# Patient Record
Sex: FEMALE | Race: BLACK OR AFRICAN AMERICAN | NOT HISPANIC OR LATINO | Employment: UNEMPLOYED | URBAN - METROPOLITAN AREA
[De-identification: names, ages, dates, MRNs, and addresses within clinical notes are randomized per-mention and may not be internally consistent; named-entity substitution may affect disease eponyms.]

---

## 2017-09-13 ENCOUNTER — HISTORICAL (OUTPATIENT)
Dept: RADIOLOGY | Facility: HOSPITAL | Age: 47
End: 2017-09-13

## 2019-01-15 ENCOUNTER — HISTORICAL (OUTPATIENT)
Dept: RADIOLOGY | Facility: HOSPITAL | Age: 49
End: 2019-01-15

## 2021-09-27 ENCOUNTER — HISTORICAL (OUTPATIENT)
Dept: RADIOLOGY | Facility: HOSPITAL | Age: 51
End: 2021-09-27

## 2021-10-11 ENCOUNTER — HISTORICAL (OUTPATIENT)
Dept: SURGERY | Facility: HOSPITAL | Age: 51
End: 2021-10-11

## 2021-10-12 ENCOUNTER — HISTORICAL (OUTPATIENT)
Dept: ANESTHESIOLOGY | Facility: HOSPITAL | Age: 51
End: 2021-10-12

## 2021-10-27 ENCOUNTER — HISTORICAL (OUTPATIENT)
Dept: RADIOLOGY | Facility: HOSPITAL | Age: 51
End: 2021-10-27

## 2022-04-07 ENCOUNTER — HISTORICAL (OUTPATIENT)
Dept: ADMINISTRATIVE | Facility: HOSPITAL | Age: 52
End: 2022-04-07

## 2022-04-24 VITALS
DIASTOLIC BLOOD PRESSURE: 80 MMHG | WEIGHT: 151.88 LBS | SYSTOLIC BLOOD PRESSURE: 111 MMHG | HEIGHT: 64 IN | BODY MASS INDEX: 25.93 KG/M2

## 2022-04-30 NOTE — OP NOTE
Patient:   Donna Romero             MRN: [id number]            FIN: [id number]               Age:   51 years     Sex:  Female     :  1970   Associated Diagnoses:   Encounter for screening colonoscopy; Cecal polyp   Author:   Ashely Laguerre MD      Operative Note   Operative Information   Date/ Time:  10/12/2021 10:37:00.     Procedures Performed: Procedure Code   Colonoscopy, flexible; with removal of tumor(s), polyp(s), or other lesion(s) by hot biopsy forceps ([id number])..     Indications: 51-year-old female in need of first age-appropriate colonoscopy with no family history colorectal cancer.     Preoperative Diagnosis: Encounter for screening colonoscopy (FAP07-CP Z12.11).     Postoperative Diagnosis: Cecal polyp (MKB28-JD K63.5).     Surgeon: Ashely Laguerre MD.     Anesthesia: Intravenous MAC.     Speciman Removed: Cecal polyp.     Description of Procedure/Findings/    Complications: Patient was brought to the endoscopy suite laid in the left lateral decubitus position right side up.  Intravenous anesthesia was provided.  Digital rectal exam performed exhibiting good anal rectal tone and no masses.  An endoscope was then passed through the anus intubating the rectum and with gentle insufflation reaching the cecum.  Upon reaching the cecum pictures were taken the scope was then slowly withdrawn.  Immediately within the cecum a small subcentimeter polyp was found.  Cold forcep polypectomy was then performed successfully.  Specimen was sent to pathology.  The remainder of the ascending transverse descending and rectosigmoid colon appear to be grossly normal otherwise.  Scope was retroflexed in the distal rectum revealing normal-appearing perianal mucosa no significant hemorrhoids.  Scope returned to neutral position the colon was evacuated patient was evaluated anesthesia stable condition and transferred postanesthesia care unit..     Esimated blood loss: loss  1  cc.     Findings: Cecal polyp.      Complications: None.     Notes: Recommendation repeat colonoscopy in 5 years.

## 2022-05-06 ENCOUNTER — HOSPITAL ENCOUNTER (OUTPATIENT)
Dept: RADIOLOGY | Facility: HOSPITAL | Age: 52
Discharge: HOME | End: 2022-05-06
Attending: INTERNAL MEDICINE
Payer: MEDICAID

## 2022-05-06 DIAGNOSIS — M79.641 HAND PAIN, RIGHT: ICD-10-CM

## 2022-05-06 PROCEDURE — 73130 X-RAY EXAM OF HAND: CPT | Mod: TC,RT

## 2022-10-13 ENCOUNTER — HOSPITAL ENCOUNTER (OUTPATIENT)
Dept: RADIOLOGY | Facility: HOSPITAL | Age: 52
Discharge: HOME | End: 2022-10-13
Attending: INTERNAL MEDICINE
Payer: MEDICAID

## 2022-10-13 DIAGNOSIS — R05.9 COUGH: ICD-10-CM

## 2022-10-13 PROCEDURE — 71046 X-RAY EXAM CHEST 2 VIEWS: CPT | Mod: TC

## 2023-04-19 DIAGNOSIS — M81.0 OSTEOPOROSIS: Primary | ICD-10-CM

## 2023-04-25 ENCOUNTER — LAB VISIT (OUTPATIENT)
Dept: LAB | Facility: HOSPITAL | Age: 53
End: 2023-04-25
Attending: INTERNAL MEDICINE
Payer: MEDICAID

## 2023-04-25 DIAGNOSIS — R76.0 RAISED ANTIBODY TITER: ICD-10-CM

## 2023-04-25 DIAGNOSIS — M05.79 SEROPOSITIVE RHEUMATOID ARTHRITIS OF MULTIPLE SITES: ICD-10-CM

## 2023-04-25 DIAGNOSIS — M06.9 RHEUMATOID ARTHRITIS, INVOLVING UNSPECIFIED SITE, UNSPECIFIED WHETHER RHEUMATOID FACTOR PRESENT: Primary | ICD-10-CM

## 2023-04-25 DIAGNOSIS — R79.82 ELEVATED C-REACTIVE PROTEIN (CRP): ICD-10-CM

## 2023-04-25 DIAGNOSIS — L93.2 CUTANEOUS LUPUS ERYTHEMATOSUS: ICD-10-CM

## 2023-04-25 LAB
C3 SERPL-MCNC: 179 MG/DL (ref 80–173)
C4 SERPL-MCNC: 20.6 MG/DL (ref 13–46)
CRP SERPL-MCNC: 14.8 MG/L
ERYTHROCYTE [SEDIMENTATION RATE] IN BLOOD: 22 MM/HR (ref 0–20)
RHEUMATOID FACT SERPL-ACNC: <13 IU/ML

## 2023-04-25 PROCEDURE — 30000890 MAYO GENERIC ORDERABLE: Mod: 90

## 2023-04-25 PROCEDURE — 86431 RHEUMATOID FACTOR QUANT: CPT | Mod: 91

## 2023-04-25 PROCEDURE — 86431 RHEUMATOID FACTOR QUANT: CPT

## 2023-04-25 PROCEDURE — 86160 COMPLEMENT ANTIGEN: CPT

## 2023-04-25 PROCEDURE — 86003 ALLG SPEC IGE CRUDE XTRC EA: CPT

## 2023-04-25 PROCEDURE — 86225 DNA ANTIBODY NATIVE: CPT | Mod: 90

## 2023-04-25 PROCEDURE — 86039 ANTINUCLEAR ANTIBODIES (ANA): CPT | Mod: 90

## 2023-04-25 PROCEDURE — 86235 NUCLEAR ANTIGEN ANTIBODY: CPT | Mod: 90

## 2023-04-25 PROCEDURE — 85651 RBC SED RATE NONAUTOMATED: CPT

## 2023-04-25 PROCEDURE — 86235 NUCLEAR ANTIGEN ANTIBODY: CPT

## 2023-04-25 PROCEDURE — 86140 C-REACTIVE PROTEIN: CPT

## 2023-04-25 PROCEDURE — 36415 COLL VENOUS BLD VENIPUNCTURE: CPT

## 2023-04-25 PROCEDURE — 83516 IMMUNOASSAY NONANTIBODY: CPT | Mod: 90

## 2023-04-26 LAB
ANA SER QL HEP2 SUBST: NORMAL
CYCLIC CITRULLINATED PEPTIDE (CCP) (OLG): NEGATIVE
ENA SS-A IGG SER-ACNC: <0.2 U
ENA SS-B IGG SER-ACNC: <0.2 U
MAYO GENERIC ORDERABLE RESULT: NORMAL
MAYO GENERIC ORDERABLE RESULT: NORMAL
RHEUMATOID FACTOR IGA (OLG): NEGATIVE
RHEUMATOID FACTOR IGM (OLG): POSITIVE
SMITH DP IGG (OHS): NEGATIVE

## 2023-04-30 LAB — MAYO GENERIC ORDERABLE RESULT: NORMAL

## 2023-05-02 ENCOUNTER — HOSPITAL ENCOUNTER (OUTPATIENT)
Dept: RADIOLOGY | Facility: HOSPITAL | Age: 53
Discharge: HOME | End: 2023-05-02
Attending: INTERNAL MEDICINE
Payer: MEDICAID

## 2023-05-02 DIAGNOSIS — M81.0 OSTEOPOROSIS: ICD-10-CM

## 2023-05-02 DIAGNOSIS — Z12.31 ENCOUNTER FOR SCREENING MAMMOGRAM FOR MALIGNANT NEOPLASM OF BREAST: ICD-10-CM

## 2023-05-02 PROCEDURE — 77067 MAMMO DIGITAL SCREENING BILAT WITH TOMO: ICD-10-PCS | Mod: 26,,, | Performed by: STUDENT IN AN ORGANIZED HEALTH CARE EDUCATION/TRAINING PROGRAM

## 2023-05-02 PROCEDURE — 77067 SCR MAMMO BI INCL CAD: CPT | Mod: 26,,, | Performed by: STUDENT IN AN ORGANIZED HEALTH CARE EDUCATION/TRAINING PROGRAM

## 2023-05-02 PROCEDURE — 77063 MAMMO DIGITAL SCREENING BILAT WITH TOMO: ICD-10-PCS | Mod: 26,,, | Performed by: STUDENT IN AN ORGANIZED HEALTH CARE EDUCATION/TRAINING PROGRAM

## 2023-05-02 PROCEDURE — 77067 SCR MAMMO BI INCL CAD: CPT | Mod: TC

## 2023-05-02 PROCEDURE — 77063 BREAST TOMOSYNTHESIS BI: CPT | Mod: 26,,, | Performed by: STUDENT IN AN ORGANIZED HEALTH CARE EDUCATION/TRAINING PROGRAM

## 2023-05-02 PROCEDURE — 77080 DXA BONE DENSITY AXIAL: CPT | Mod: TC

## 2023-06-26 DIAGNOSIS — M06.9 RHEUMATOID ARTHRITIS, INVOLVING UNSPECIFIED SITE, UNSPECIFIED WHETHER RHEUMATOID FACTOR PRESENT: Primary | ICD-10-CM

## 2023-09-27 ENCOUNTER — HOSPITAL ENCOUNTER (OUTPATIENT)
Dept: RADIOLOGY | Facility: HOSPITAL | Age: 53
Discharge: HOME | End: 2023-09-27
Attending: INTERNAL MEDICINE
Payer: MEDICAID

## 2023-09-27 DIAGNOSIS — R05.9 COUGH: ICD-10-CM

## 2023-09-27 PROCEDURE — 71046 X-RAY EXAM CHEST 2 VIEWS: CPT | Mod: TC

## 2024-03-14 ENCOUNTER — OFFICE VISIT (OUTPATIENT)
Dept: RHEUMATOLOGY | Facility: CLINIC | Age: 54
End: 2024-03-14
Payer: MEDICAID

## 2024-03-14 VITALS
WEIGHT: 163.63 LBS | BODY MASS INDEX: 28.99 KG/M2 | OXYGEN SATURATION: 100 % | TEMPERATURE: 98 F | HEIGHT: 63 IN | RESPIRATION RATE: 18 BRPM | SYSTOLIC BLOOD PRESSURE: 137 MMHG | HEART RATE: 87 BPM | DIASTOLIC BLOOD PRESSURE: 99 MMHG

## 2024-03-14 DIAGNOSIS — Z79.4 TYPE 2 DIABETES MELLITUS WITHOUT COMPLICATION, WITH LONG-TERM CURRENT USE OF INSULIN: ICD-10-CM

## 2024-03-14 DIAGNOSIS — E78.00 HYPERCHOLESTEROLEMIA: ICD-10-CM

## 2024-03-14 DIAGNOSIS — F32.A DEPRESSIVE DISORDER: ICD-10-CM

## 2024-03-14 DIAGNOSIS — M79.7 FIBROMYALGIA: Primary | ICD-10-CM

## 2024-03-14 DIAGNOSIS — R76.8 RHEUMATOID FACTOR POSITIVE: ICD-10-CM

## 2024-03-14 DIAGNOSIS — I10 ESSENTIAL HYPERTENSION: ICD-10-CM

## 2024-03-14 DIAGNOSIS — E11.9 TYPE 2 DIABETES MELLITUS WITHOUT COMPLICATION, WITH LONG-TERM CURRENT USE OF INSULIN: ICD-10-CM

## 2024-03-14 DIAGNOSIS — Z86.73 H/O: CVA (CEREBROVASCULAR ACCIDENT): ICD-10-CM

## 2024-03-14 PROBLEM — F41.0 PANIC ATTACK: Status: ACTIVE | Noted: 2024-03-14

## 2024-03-14 PROBLEM — I63.9 CEREBROVASCULAR ACCIDENT (CVA): Status: ACTIVE | Noted: 2024-03-14

## 2024-03-14 PROCEDURE — 99215 OFFICE O/P EST HI 40 MIN: CPT | Mod: PBBFAC | Performed by: INTERNAL MEDICINE

## 2024-03-14 PROCEDURE — 3075F SYST BP GE 130 - 139MM HG: CPT | Mod: CPTII,,, | Performed by: INTERNAL MEDICINE

## 2024-03-14 PROCEDURE — 1159F MED LIST DOCD IN RCRD: CPT | Mod: CPTII,,, | Performed by: INTERNAL MEDICINE

## 2024-03-14 PROCEDURE — 3008F BODY MASS INDEX DOCD: CPT | Mod: CPTII,,, | Performed by: INTERNAL MEDICINE

## 2024-03-14 PROCEDURE — 3080F DIAST BP >= 90 MM HG: CPT | Mod: CPTII,,, | Performed by: INTERNAL MEDICINE

## 2024-03-14 PROCEDURE — 99205 OFFICE O/P NEW HI 60 MIN: CPT | Mod: S$PBB,,, | Performed by: INTERNAL MEDICINE

## 2024-03-14 RX ORDER — ZIPRASIDONE HYDROCHLORIDE 80 MG/1
80 CAPSULE ORAL 2 TIMES DAILY
COMMUNITY

## 2024-03-14 RX ORDER — INSULIN PUMP CONTROLLER
EACH MISCELLANEOUS
COMMUNITY
Start: 2024-01-17

## 2024-03-14 RX ORDER — METOPROLOL SUCCINATE 50 MG/1
1 TABLET, EXTENDED RELEASE ORAL DAILY
COMMUNITY
Start: 2023-08-15

## 2024-03-14 RX ORDER — ERGOCALCIFEROL 1.25 1/1
50000 CAPSULE ORAL
COMMUNITY

## 2024-03-14 RX ORDER — INSULIN PUMP CONTROLLER
EACH MISCELLANEOUS
COMMUNITY
Start: 2024-02-15

## 2024-03-14 RX ORDER — LABETALOL 100 MG/1
100 TABLET, FILM COATED ORAL ONCE
COMMUNITY

## 2024-03-14 RX ORDER — ATORVASTATIN CALCIUM 40 MG/1
1 TABLET, FILM COATED ORAL DAILY
COMMUNITY

## 2024-03-14 RX ORDER — ASPIRIN 81 MG/1
81 TABLET ORAL DAILY
COMMUNITY
Start: 2021-10-11

## 2024-03-14 RX ORDER — CLONIDINE HYDROCHLORIDE 0.1 MG/1
0.1 TABLET ORAL ONCE AS NEEDED
COMMUNITY

## 2024-03-14 RX ORDER — BUTALBITAL, ACETAMINOPHEN AND CAFFEINE 50; 325; 40 MG/1; MG/1; MG/1
1 TABLET ORAL EVERY 6 HOURS PRN
COMMUNITY

## 2024-03-14 RX ORDER — LINACLOTIDE 145 UG/1
145 CAPSULE, GELATIN COATED ORAL DAILY PRN
COMMUNITY
Start: 2021-09-06

## 2024-03-14 RX ORDER — INSULIN ASPART 100 [IU]/ML
INJECTION, SOLUTION INTRAVENOUS; SUBCUTANEOUS
COMMUNITY

## 2024-03-14 RX ORDER — INSULIN GLARGINE 100 [IU]/ML
INJECTION, SOLUTION SUBCUTANEOUS
COMMUNITY
Start: 2023-12-14

## 2024-03-14 RX ORDER — PEN NEEDLE, DIABETIC 31 GX5/16"
NEEDLE, DISPOSABLE MISCELLANEOUS
COMMUNITY
Start: 2024-02-15

## 2024-03-14 RX ORDER — LINAGLIPTIN 5 MG/1
1 TABLET, FILM COATED ORAL DAILY
COMMUNITY
Start: 2021-10-11

## 2024-03-14 RX ORDER — DULOXETIN HYDROCHLORIDE 30 MG/1
30 CAPSULE, DELAYED RELEASE ORAL DAILY
COMMUNITY

## 2024-03-14 RX ORDER — ZOLPIDEM TARTRATE 10 MG/1
10 TABLET ORAL NIGHTLY PRN
COMMUNITY

## 2024-03-14 RX ORDER — AMLODIPINE BESYLATE 10 MG/1
10 TABLET ORAL DAILY
COMMUNITY

## 2024-03-14 RX ORDER — BUSPIRONE HYDROCHLORIDE 15 MG/1
15 TABLET ORAL 3 TIMES DAILY
COMMUNITY

## 2024-03-14 RX ORDER — BUTALBITAL, ACETAMINOPHEN, CAFFEINE AND CODEINE PHOSPHATE 50; 325; 40; 30 MG/1; MG/1; MG/1; MG/1
CAPSULE ORAL
COMMUNITY
Start: 2021-10-19

## 2024-03-14 RX ORDER — EMPAGLIFLOZIN 25 MG/1
1 TABLET, FILM COATED ORAL DAILY
COMMUNITY
Start: 2023-12-14

## 2024-03-14 RX ORDER — DICLOFENAC SODIUM 30 MG/G
GEL TOPICAL 3 TIMES DAILY PRN
COMMUNITY

## 2024-03-14 RX ORDER — ALPRAZOLAM 1 MG/1
1 TABLET ORAL DAILY PRN
COMMUNITY

## 2024-03-14 RX ORDER — OMEPRAZOLE 40 MG/1
40 CAPSULE, DELAYED RELEASE ORAL EVERY MORNING
COMMUNITY

## 2024-03-14 RX ORDER — CALCIUM CITRATE/VITAMIN D3 200MG-6.25
TABLET ORAL
COMMUNITY
Start: 2023-12-14

## 2024-03-14 RX ORDER — GABAPENTIN 400 MG/1
400 CAPSULE ORAL 3 TIMES DAILY
COMMUNITY

## 2024-03-14 NOTE — PROGRESS NOTES
Patient ID: [id number]     Chief Complaint: Rheumatoid Arthritis (Pt states she has leg pain. )      Referred By: Dr. Lucas Ray     HPI:     Donna Romero is a 53 y.o. female here today for a new patient visit.      C/o pain in legs, whole legs, starts form thigh and radiates to bilateral feet. Admits cramps, tingling and numbness in bilateral toes. Sometimes she has to stay in bed because of pain, went to ER multiple times because of pain. Pain in right arm sometimes. She has this pain since she was 13 years old. Admits swelling in joints of hands and feet sometimes. Morning stiffness for an hour.   She has been seeing Dr Ray since she was 23 years old.   She has DM on insulin, not controlled, HgA1C was 14.2, she is waiting for insulin pump. She also takes Jardiance and Tradjenta.   H/o CVA when she was 23 years old form HTN.     Denies history of fevers, rashes, photosensitivity, oral or nasal ulcers, h/o MI, seizures, h/o PE or DVT, Raynaud's phenomenon, uveitis, malignancies.   Family history of autoimmune disease: none  Pregnancies: 7  Miscarriages: 4, before 3-4 weeks.   Smoking: nonsmoker.       Social History     Tobacco Use   Smoking Status Never   Smokeless Tobacco Never          ----------------------------  Anxiety disorder, unspecified  Essential (primary) hypertension  Rheumatoid arthritis, unspecified  Stroke  Type 2 diabetes mellitus without complications     Past Surgical History:   Procedure Laterality Date    BACK SURGERY       SECTION         Review of patient's allergies indicates:   Allergen Reactions    Plaquenil [hydroxychloroquine]     Sulfa (sulfonamide antibiotics) Other (See Comments)       Outpatient Medications Marked as Taking for the 3/14/24 encounter (Office Visit) with Marquita Thomas MD   Medication Sig Dispense Refill    ALPRAZolam (XANAX) 1 MG tablet Take 1 mg by mouth daily as needed.      amLODIPine (NORVASC) 10 MG tablet Take 10 mg by mouth once daily.       "aspirin (ECOTRIN) 81 MG EC tablet Take 81 mg by mouth once daily.      atorvastatin (LIPITOR) 40 MG tablet Take 1 tablet by mouth once daily.      BD ULTRA-FINE SHORT PEN NEEDLE 31 gauge x 5/16" Ndle SMARTSI Each SUB-Q Daily      busPIRone (BUSPAR) 15 MG tablet Take 15 mg by mouth 3 (three) times daily. Pt takes bid      butalbitaL-acetaminop-caf-cod -23-30 mg Cap Take 1 capsule as needed by oral route for 15 days.      butalbital-acetaminophen-caffeine -40 mg (FIORICET, ESGIC) -40 mg per tablet Take 1 tablet by mouth every 6 (six) hours as needed.      cloNIDine (CATAPRES) 0.1 MG tablet Take 0.1 mg by mouth 1 (one) time if needed. Pt takes for high blood pressure      diclofenac sodium (SOLARAZE) 3 % gel Apply topically 3 (three) times daily as needed.      DULoxetine (CYMBALTA) 30 MG capsule Take 30 mg by mouth once daily.      gabapentin (NEURONTIN) 400 MG capsule Take 400 mg by mouth 3 (three) times daily.      JARDIANCE 25 mg tablet Take 1 tablet by mouth once daily.      labetaloL (NORMODYNE) 100 MG tablet Take 100 mg by mouth once.      LANTUS SOLOSTAR U-100 INSULIN glargine 100 units/mL SubQ pen Inject 45 units every day by subcutaneous route.      LINZESS 145 mcg Cap capsule Take 145 mcg by mouth daily as needed (patient takes once a week).      metoprolol succinate (TOPROL-XL) 50 MG 24 hr tablet Take 1 tablet by mouth once daily.      NOVOLOG FLEXPEN U-100 INSULIN 100 unit/mL (3 mL) InPn pen per sliding scale      omeprazole (PRILOSEC) 40 MG capsule Take 40 mg by mouth every morning.      OMNIPOD DASH PDM KIT, GEN 4, Misc USE AS DIRECTED, CHANGE POD EVERY 72 HOURS.      OMNIPOD DASH PODS, GEN 4, Crtg Inject into the skin.      TRADJENTA 5 mg Tab tablet Take 1 tablet by mouth once daily.      TRUE METRIX GLUCOSE TEST STRIP Strp       VITAMIN D2 1,250 mcg (50,000 unit) capsule Take 50,000 Units by mouth every 7 days.      ziprasidone (GEODON) 80 MG capsule Take 80 mg by mouth 2 (two) " times daily.      zolpidem (AMBIEN) 10 mg Tab Take 10 mg by mouth nightly as needed.         Social History     Socioeconomic History    Marital status: Single   Tobacco Use    Smoking status: Never    Smokeless tobacco: Never   Substance and Sexual Activity    Alcohol use: Not Currently    Drug use: Never        Family History   Problem Relation Age of Onset    Hypertension Mother     Heart disease Mother     Diabetes Mellitus Mother     Heart attack Mother     Cancer Father 63        Throat        Immunization History   Administered Date(s) Administered    COVID-19, MRNA, LN-S, PF (Pfizer) (Purple Cap) 06/03/2021, 06/24/2021    DTP 1970, 1970, 03/07/1972, 12/04/1973, 08/28/1977    MMR 03/02/2004    Measles / Rubella 09/12/1972    Mumps 04/15/1980    OPV 1971, 03/07/1972, 12/04/1973, 08/31/1976, 08/23/1977    Td (ADULT) 03/02/2004       Patient Care Team:  Lucas Ray MD as PCP - General (Internal Medicine)     Subjective:     Constitutional:  Denies chills. Denies fever. Denies night sweats. Denies weight loss.   Ophthalmology: Denies blurred vision. Denies dry eyes. Denies eye pain. Denies Itching and redness.   ENT: Denies oral ulcers. Denies epistaxis. Denies dry mouth. Denies swollen glands.   Endocrine: Admits diabetes. Denies thyroid Problems.   Respiratory: Denies cough. Denies shortness of breath. Denies shortness of breath with exertion. Denies hemoptysis.   Cardiovascular: Denies chest pain at rest. Denies chest pain with exertion. Denies palpitations.    Gastrointestinal: Denies abdominal pain. Denies diarrhea. Denies nausea. Denies vomiting. Denies hematemesis or hematochezia. Denies heartburn.  Genitourinary: Denies blood in urine.  Musculoskeletal: See HPI for details  Integumentary: Denies rash. Denies photosensitivity.   Peripheral Vascular: Denies Ulcers of hands and/or feet. Denies Cold extremities.   Neurologic: Denies dizziness. Denies headache.  Denies loss of strength.  "Denies numbness or tingling.   Psychiatric: Admits depression. Admits anxiety. Denies suicidal/homicidal ideations.      Objective:     BP (!) 137/99 (BP Location: Left arm, Patient Position: Sitting, BP Method: Medium (Automatic))   Pulse 87   Temp 97.9 °F (36.6 °C) (Oral)   Resp 18   Ht 5' 3" (1.6 m)   Wt 74.2 kg (163 lb 9.6 oz)   SpO2 100%   BMI 28.98 kg/m²     Physical Exam    General Appearance: alert, pleasant, in no acute distress.  Skin: Skin color, texture, turgor normal. No rashes or lesions.  Eyes:  extraocular movement intact (EOMI), pupils equal, round, reactive to light and accommodation, conjunctiva clear.  ENT: No oral or nasal ulcers.  Neck:  Neck supple. No adenopathy.   Lungs: CTA throughout without crackles, rhonchi, or wheezes.   Heart: RRR w/o murmurs.  No edema.   Abdomen: Soft, non-tender, no masses, rebound or guarding.  Neuro: Alert, oriented, CN II-XII GI, sensory and motor innervation intact.  Musculoskeletal:  Multiple tender points on exam, tenderness in multiple soft tissue regions.  No synovitis on exam.  No tenderness in MCPs or MTPs.  Callus/corn below left first MTP  Psych: Alert, oriented, normal eye contact.      Labs:     4/2023: LIZETH, dsDNA, chromatin, centromere, SSA, SSB, smith negative. C3, C4 normal. CRP 14.8, normal less than 5. ESR 22, normal less than 20. CCP negative. RF IgA negative. RF IgM positive, no value given. RF quantitative negative.     Imaging:     Assessment:       ICD-10-CM ICD-9-CM   1. Fibromyalgia  M79.7 729.1   2. Rheumatoid factor positive  R76.8 795.79   3. Essential hypertension  I10 401.9   4. Hypercholesterolemia  E78.00 272.0   5. Type 2 diabetes mellitus without complication, with long-term current use of insulin  E11.9 250.00    Z79.4 V58.67   6. Depressive disorder  F32.A 311   7. H/O: CVA (cerebrovascular accident)  Z86.73 V12.54        Plan:     1. Fibromyalgia:  Multiple tender points on exam, no synovitis on exam.  Pain in arms and " legs secondary to fibromyalgia.  Discussed disease course and treatment options of fibromyalgia.    Discussed clinical features of fibromyalgia in detail.  Fibromyalgia is a chronic pain syndrome.  Underlying causes of fibromyalgia may be numerous.  An underlying nonrestorative sleep pattern, mental and physical stressors play a role in pain perception.    Discussed relationship of pain with sleep.  The brain functions best with a normal restful sleep that includes REM sleep.  Discussed that a nonrestorative sleep pattern may cause a decrease in pain tolerance.  Discussed that over time, this builds up and causes a cycling effect on pain.  This pain is not easily curable with pain medications or narcotics.  It is important to decrease stress levels and improving sleep patterns/hygiene.  A restorative sleep pattern is important in improving the perception of pain.    Medications for fibromyalgia only help 10-20% of the time and come with multiple side effects. FDA recommended medications for fibromyalgia include: lyrica, cymbalta, and savella.  Other medications which have been used in fibromyalgia include amitriptyline, gabapentin, flexeril, gabapentin, and low dose naltresone.     Exercise and a healthy life-style is important in management of this syndrome. We discussed this at length and I have recommended regular low impact exercise, preferably aquatic therapy, as well as cognitive/ behavioral therapy.       2. Rheumatoid factor positive:  Positive rheumatoid factor IgM, no synovitis on exam.  Anti CCP negative.  Currently does not have rheumatoid arthritis or other inflammatory arthritis.  Pain in arms and legs secondary to fibromyalgia.  - advised to follow-up with her PCP and she can be re-referred for any new issues in the future.  Thank you for the referral.     3. Essential hypertension:  Blood pressure slightly high today, educated to stay compliant with blood pressure medications.       4. Type 2  diabetes mellitus:  Poorly-controlled diabetes, advised to follow-up with her PCP closely.  Educated to stay compliant with diet and medications.          Follow up if symptoms worsen or fail to improve. In addition to their scheduled follow up, the patient has also been instructed to follow up on as needed basis.        Total time spent with patient and documentation is 60 minutes. All questions were answered to patient's satisfaction and patient verbalized understanding.

## 2024-05-08 ENCOUNTER — LAB VISIT (OUTPATIENT)
Dept: LAB | Facility: HOSPITAL | Age: 54
End: 2024-05-08
Attending: INTERNAL MEDICINE
Payer: MEDICAID

## 2024-05-08 DIAGNOSIS — M06.9 RHEUMATOID ARTHRITIS, INVOLVING UNSPECIFIED SITE, UNSPECIFIED WHETHER RHEUMATOID FACTOR PRESENT: ICD-10-CM

## 2024-05-08 DIAGNOSIS — I10 HYPERTENSION, UNSPECIFIED TYPE: ICD-10-CM

## 2024-05-08 DIAGNOSIS — E11.65 INADEQUATELY CONTROLLED DIABETES MELLITUS: Primary | ICD-10-CM

## 2024-05-08 DIAGNOSIS — M79.10 MYALGIA: ICD-10-CM

## 2024-05-08 DIAGNOSIS — E78.5 HYPERLIPIDEMIA, UNSPECIFIED HYPERLIPIDEMIA TYPE: ICD-10-CM

## 2024-05-08 LAB
ALBUMIN SERPL-MCNC: 3.3 G/DL (ref 3.5–5)
ALBUMIN/GLOB SERPL: 0.7 RATIO (ref 1.1–2)
ALP SERPL-CCNC: 97 UNIT/L (ref 40–150)
ALT SERPL-CCNC: 13 UNIT/L (ref 0–55)
APPEARANCE UR: CLEAR
AST SERPL-CCNC: 16 UNIT/L (ref 5–34)
BASOPHILS # BLD AUTO: 0.04 X10(3)/MCL
BASOPHILS NFR BLD AUTO: 0.4 %
BILIRUB SERPL-MCNC: 0.4 MG/DL
BILIRUB UR QL STRIP.AUTO: NEGATIVE
BUN SERPL-MCNC: 14 MG/DL (ref 9.8–20.1)
C3 SERPL-MCNC: 162 MG/DL (ref 80–173)
C4 SERPL-MCNC: 20 MG/DL (ref 13–46)
CALCIUM SERPL-MCNC: 9.4 MG/DL (ref 8.4–10.2)
CHLORIDE SERPL-SCNC: 105 MMOL/L (ref 98–107)
CHOLEST SERPL-MCNC: 153 MG/DL
CHOLEST/HDLC SERPL: 4 {RATIO} (ref 0–5)
CK SERPL-CCNC: 103 U/L (ref 29–168)
CO2 SERPL-SCNC: 29 MMOL/L (ref 22–29)
COLOR UR AUTO: YELLOW
CREAT SERPL-MCNC: 0.99 MG/DL (ref 0.55–1.02)
CRP SERPL-MCNC: 18.8 MG/L
EOSINOPHIL # BLD AUTO: 0.13 X10(3)/MCL (ref 0–0.9)
EOSINOPHIL NFR BLD AUTO: 1.1 %
ERYTHROCYTE [DISTWIDTH] IN BLOOD BY AUTOMATED COUNT: 15 % (ref 11.5–17)
ERYTHROCYTE [SEDIMENTATION RATE] IN BLOOD: 23 MM/HR (ref 0–20)
EST. AVERAGE GLUCOSE BLD GHB EST-MCNC: 226 MG/DL
GFR SERPLBLD CREATININE-BSD FMLA CKD-EPI: >60 ML/MIN/1.73/M2
GLOBULIN SER-MCNC: 4.5 GM/DL (ref 2.4–3.5)
GLUCOSE SERPL-MCNC: 67 MG/DL (ref 74–100)
GLUCOSE UR QL STRIP.AUTO: ABNORMAL
HBA1C MFR BLD: 9.5 %
HCT VFR BLD AUTO: 42.5 % (ref 37–47)
HDLC SERPL-MCNC: 42 MG/DL (ref 35–60)
HGB BLD-MCNC: 13.2 G/DL (ref 12–16)
IMM GRANULOCYTES # BLD AUTO: 0.04 X10(3)/MCL (ref 0–0.04)
IMM GRANULOCYTES NFR BLD AUTO: 0.4 %
KETONES UR QL STRIP.AUTO: NEGATIVE
LDLC SERPL CALC-MCNC: 91 MG/DL (ref 50–140)
LEUKOCYTE ESTERASE UR QL STRIP.AUTO: NEGATIVE
LYMPHOCYTES # BLD AUTO: 3.18 X10(3)/MCL (ref 0.6–4.6)
LYMPHOCYTES NFR BLD AUTO: 27.9 %
MAGNESIUM SERPL-MCNC: 2.3 MG/DL (ref 1.6–2.6)
MCH RBC QN AUTO: 27.6 PG (ref 27–31)
MCHC RBC AUTO-ENTMCNC: 31.1 G/DL (ref 33–36)
MCV RBC AUTO: 88.9 FL (ref 80–94)
MICROALBUMIN UR-MCNC: <5 UG/ML
MONOCYTES # BLD AUTO: 0.6 X10(3)/MCL (ref 0.1–1.3)
MONOCYTES NFR BLD AUTO: 5.3 %
NEUTROPHILS # BLD AUTO: 7.42 X10(3)/MCL (ref 2.1–9.2)
NEUTROPHILS NFR BLD AUTO: 64.9 %
NITRITE UR QL STRIP.AUTO: NEGATIVE
PH UR STRIP.AUTO: 5.5 [PH]
PLATELET # BLD AUTO: 366 X10(3)/MCL (ref 130–400)
PMV BLD AUTO: 10.2 FL (ref 7.4–10.4)
POTASSIUM SERPL-SCNC: 4.1 MMOL/L (ref 3.5–5.1)
PROT SERPL-MCNC: 7.8 GM/DL (ref 6.4–8.3)
PROT UR QL STRIP.AUTO: NEGATIVE
RBC # BLD AUTO: 4.78 X10(6)/MCL (ref 4.2–5.4)
RBC UR QL AUTO: NEGATIVE
RHEUMATOID FACT SERPL-ACNC: <13 IU/ML
SODIUM SERPL-SCNC: 138 MMOL/L (ref 136–145)
SP GR UR STRIP.AUTO: 1.01 (ref 1–1.03)
TRIGL SERPL-MCNC: 100 MG/DL (ref 37–140)
UROBILINOGEN UR STRIP-ACNC: 0.2
VLDLC SERPL CALC-MCNC: 20 MG/DL
WBC # SPEC AUTO: 11.41 X10(3)/MCL (ref 4.5–11.5)

## 2024-05-08 PROCEDURE — 86140 C-REACTIVE PROTEIN: CPT

## 2024-05-08 PROCEDURE — 82550 ASSAY OF CK (CPK): CPT

## 2024-05-08 PROCEDURE — 85025 COMPLETE CBC W/AUTO DIFF WBC: CPT

## 2024-05-08 PROCEDURE — 86235 NUCLEAR ANTIGEN ANTIBODY: CPT

## 2024-05-08 PROCEDURE — 86160 COMPLEMENT ANTIGEN: CPT

## 2024-05-08 PROCEDURE — 85652 RBC SED RATE AUTOMATED: CPT

## 2024-05-08 PROCEDURE — 86225 DNA ANTIBODY NATIVE: CPT

## 2024-05-08 PROCEDURE — 80061 LIPID PANEL: CPT

## 2024-05-08 PROCEDURE — 86431 RHEUMATOID FACTOR QUANT: CPT

## 2024-05-08 PROCEDURE — 86039 ANTINUCLEAR ANTIBODIES (ANA): CPT

## 2024-05-08 PROCEDURE — 86200 CCP ANTIBODY: CPT

## 2024-05-08 PROCEDURE — 83735 ASSAY OF MAGNESIUM: CPT

## 2024-05-08 PROCEDURE — 81003 URINALYSIS AUTO W/O SCOPE: CPT

## 2024-05-08 PROCEDURE — 36415 COLL VENOUS BLD VENIPUNCTURE: CPT

## 2024-05-08 PROCEDURE — 80053 COMPREHEN METABOLIC PANEL: CPT

## 2024-05-08 PROCEDURE — 83036 HEMOGLOBIN GLYCOSYLATED A1C: CPT

## 2024-05-08 PROCEDURE — 82043 UR ALBUMIN QUANTITATIVE: CPT

## 2024-05-09 LAB
ANA SER QL HEP2 SUBST: NORMAL
ENA SS-A IGG SER-ACNC: <0.2 U
ENA SS-B IGG SER-ACNC: <0.2 U

## 2024-05-12 LAB
ANTINUCLEAR ANTIBODY SCREEN (OHS): NEGATIVE
CENTROMERE QUANT (OHS): <0.4 U/ML
CYCLIC CITRULLINATED PEPTIDE (CCP) (OHS): 1 U/ML
DSDNA AB QUANT (OHS): 0.8 IU/ML
JO-1 AB QUANT (OHS): <0.3 U/ML
RNP70 AB QUANT (OHS): 0.4 U/ML
RNP70 AB QUANT (OHS): 0.4 U/ML
SCL-70S AB QUANT (OHS): 0.6 U/ML
SMITH AB QUANT (OHS): <0.7 U/ML
SMITH AB QUANT (OHS): <0.7 U/ML
SSA(RO) AB QUANT (OHS): 0.5 U/ML
SSB(LA) AB QUANT (OHS): <0.4 U/ML
U1RNP AB QUANT (OHS): 0.9 U/ML

## 2024-05-20 ENCOUNTER — HOSPITAL ENCOUNTER (OUTPATIENT)
Dept: RADIOLOGY | Facility: HOSPITAL | Age: 54
Discharge: HOME | End: 2024-05-20
Attending: ALLERGY & IMMUNOLOGY
Payer: MEDICAID

## 2024-05-20 DIAGNOSIS — R52 PAIN: ICD-10-CM

## 2024-05-20 PROCEDURE — 73562 X-RAY EXAM OF KNEE 3: CPT | Mod: TC,RT

## 2024-06-07 DIAGNOSIS — E11.65 TYPE 2 DIABETES MELLITUS WITH HYPERGLYCEMIA, UNSPECIFIED WHETHER LONG TERM INSULIN USE: Primary | ICD-10-CM

## 2024-06-12 DIAGNOSIS — Z12.31 ENCOUNTER FOR SCREENING MAMMOGRAM FOR MALIGNANT NEOPLASM OF BREAST: Primary | ICD-10-CM

## 2024-06-17 PROBLEM — I63.9 CEREBROVASCULAR ACCIDENT (CVA): Status: RESOLVED | Noted: 2024-03-14 | Resolved: 2024-06-17

## 2024-07-23 ENCOUNTER — HOSPITAL ENCOUNTER (OUTPATIENT)
Dept: RADIOLOGY | Facility: HOSPITAL | Age: 54
Discharge: HOME | End: 2024-07-23
Attending: INTERNAL MEDICINE
Payer: MEDICAID

## 2024-07-23 DIAGNOSIS — Z12.31 ENCOUNTER FOR SCREENING MAMMOGRAM FOR MALIGNANT NEOPLASM OF BREAST: ICD-10-CM

## 2024-07-23 PROCEDURE — 77067 SCR MAMMO BI INCL CAD: CPT | Mod: TC

## 2024-12-06 ENCOUNTER — CLINICAL SUPPORT (OUTPATIENT)
Dept: ENDOCRINOLOGY | Facility: CLINIC | Age: 54
End: 2024-12-06
Payer: MEDICAID

## 2024-12-06 ENCOUNTER — OFFICE VISIT (OUTPATIENT)
Dept: ENDOCRINOLOGY | Facility: CLINIC | Age: 54
End: 2024-12-06
Payer: MEDICAID

## 2024-12-06 ENCOUNTER — LAB VISIT (OUTPATIENT)
Dept: LAB | Facility: HOSPITAL | Age: 54
End: 2024-12-06
Attending: NURSE PRACTITIONER
Payer: MEDICAID

## 2024-12-06 VITALS
BODY MASS INDEX: 29.38 KG/M2 | TEMPERATURE: 98 F | SYSTOLIC BLOOD PRESSURE: 139 MMHG | RESPIRATION RATE: 18 BRPM | HEART RATE: 80 BPM | WEIGHT: 165.81 LBS | DIASTOLIC BLOOD PRESSURE: 84 MMHG | HEIGHT: 63 IN

## 2024-12-06 DIAGNOSIS — E11.65 UNCONTROLLED TYPE 2 DIABETES MELLITUS WITH HYPERGLYCEMIA: Primary | ICD-10-CM

## 2024-12-06 DIAGNOSIS — R53.83 FATIGUE, UNSPECIFIED TYPE: ICD-10-CM

## 2024-12-06 DIAGNOSIS — E11.65 TYPE 2 DIABETES MELLITUS WITH HYPERGLYCEMIA, UNSPECIFIED WHETHER LONG TERM INSULIN USE: ICD-10-CM

## 2024-12-06 DIAGNOSIS — E11.65 UNCONTROLLED TYPE 2 DIABETES MELLITUS WITH HYPERGLYCEMIA: ICD-10-CM

## 2024-12-06 LAB
ALBUMIN SERPL-MCNC: 3.6 G/DL (ref 3.5–5)
ALBUMIN/GLOB SERPL: 0.8 RATIO (ref 1.1–2)
ALP SERPL-CCNC: 88 UNIT/L (ref 40–150)
ALT SERPL-CCNC: 12 UNIT/L (ref 0–55)
ANION GAP SERPL CALC-SCNC: 6 MEQ/L
AST SERPL-CCNC: 16 UNIT/L (ref 5–34)
BILIRUB SERPL-MCNC: 0.5 MG/DL
BUN SERPL-MCNC: 13.6 MG/DL (ref 9.8–20.1)
CALCIUM SERPL-MCNC: 9.8 MG/DL (ref 8.4–10.2)
CHLORIDE SERPL-SCNC: 105 MMOL/L (ref 98–107)
CO2 SERPL-SCNC: 28 MMOL/L (ref 22–29)
CREAT SERPL-MCNC: 0.88 MG/DL (ref 0.55–1.02)
CREAT UR-MCNC: 100.2 MG/DL (ref 45–106)
CREAT/UREA NIT SERPL: 15
GFR SERPLBLD CREATININE-BSD FMLA CKD-EPI: >60 ML/MIN/1.73/M2
GLOBULIN SER-MCNC: 4.7 GM/DL (ref 2.4–3.5)
GLUCOSE SERPL-MCNC: 74 MG/DL (ref 74–100)
HBA1C MFR BLD: 9.2 %
MICROALBUMIN UR-MCNC: 25.2 UG/ML
MICROALBUMIN/CREAT RATIO PNL UR: 25.1 MG/GM CR (ref 0–30)
POTASSIUM SERPL-SCNC: 4.1 MMOL/L (ref 3.5–5.1)
PROT SERPL-MCNC: 8.3 GM/DL (ref 6.4–8.3)
SODIUM SERPL-SCNC: 139 MMOL/L (ref 136–145)
T4 FREE SERPL-MCNC: 1.14 NG/DL (ref 0.7–1.48)
TSH SERPL-ACNC: 2.05 UIU/ML (ref 0.35–4.94)

## 2024-12-06 PROCEDURE — 84439 ASSAY OF FREE THYROXINE: CPT

## 2024-12-06 PROCEDURE — 82570 ASSAY OF URINE CREATININE: CPT | Performed by: NURSE PRACTITIONER

## 2024-12-06 PROCEDURE — 80053 COMPREHEN METABOLIC PANEL: CPT

## 2024-12-06 PROCEDURE — 86376 MICROSOMAL ANTIBODY EACH: CPT

## 2024-12-06 PROCEDURE — 84681 ASSAY OF C-PEPTIDE: CPT

## 2024-12-06 PROCEDURE — 84443 ASSAY THYROID STIM HORMONE: CPT

## 2024-12-06 PROCEDURE — 99214 OFFICE O/P EST MOD 30 MIN: CPT | Mod: PBBFAC | Performed by: NURSE PRACTITIONER

## 2024-12-06 PROCEDURE — 36415 COLL VENOUS BLD VENIPUNCTURE: CPT

## 2024-12-06 PROCEDURE — 86341 ISLET CELL ANTIBODY: CPT

## 2024-12-06 RX ORDER — BLOOD-GLUCOSE TRANSMITTER
EACH MISCELLANEOUS
Qty: 1 EACH | Refills: 3 | Status: SHIPPED | OUTPATIENT
Start: 2024-12-06

## 2024-12-06 RX ORDER — BLOOD-GLUCOSE SENSOR
EACH MISCELLANEOUS
Qty: 3 EACH | Refills: 11 | Status: SHIPPED | OUTPATIENT
Start: 2024-12-06

## 2024-12-06 RX ORDER — TRAMADOL HYDROCHLORIDE 50 MG/1
50 TABLET ORAL EVERY 6 HOURS
COMMUNITY

## 2024-12-06 RX ORDER — INSULIN PMP CART,AUT,G6/7,CNTR
EACH SUBCUTANEOUS
Qty: 10 EACH | Refills: 11 | Status: SHIPPED | OUTPATIENT
Start: 2024-12-06

## 2024-12-06 RX ORDER — INSULIN PMP CART,AUT,G6/7,CNTR
EACH SUBCUTANEOUS
Qty: 1 EACH | Refills: 0 | Status: SHIPPED | OUTPATIENT
Start: 2024-12-06

## 2024-12-06 NOTE — PROGRESS NOTES
Donnanoam Romero is a 54 y.o. female here for a diabetic eye screening with non-dilated fundus photos per Khushbu.    Patient cooperative?: Yes  Small pupils?: Yes  Last eye exam: unknown    For exam results, see Encounter Report.

## 2024-12-06 NOTE — PROGRESS NOTES
Subjective     Patient ID: Donna Romero is a 54 y.o. female.    Chief Complaint: Diabetes (NEW PATIENT POC )    Endocrine clinic note 12/06/2024:  54-year-old female scheduled today for endocrine clinic as new patient referral.  History of uncontrolled type 2 diabetes currently on Omnipod dash.  Patient states she was on oral medications her A1c was > 14 and she was started on MDI then transitioned to an Omnipod.  Current A1c 9.2.  Patient has a the clinic today she does not have her Dexcom G6 x2 weeks states she needs a transmitter.  Patient has been on Omnipod-but has had no formal training and does not enter carbs are prandial doses.  Unable to download patient's Dexcom today she is not 1 in 2 weeks and did not bring her .  Patient states her PCP road supplies for an Omnipod 5 and she has a supplies but has not been able to start due to have no one able to train her.  Discussed with the patient I will have her set up with Diabetes Education next week she is to bring Omnipod 5 supplies with Dexcom G6 supplies for formal training and to convert to the Omnipod 5.  Patient states currently at night that she is having hypoglycemia and waking up sweating on today's visit decrease patient's nighttime basal rate on her Omnipod.  Patient previously had medications which she states was stopped then restarted by different PCP were then stopped again she states Jardiance was rewritten she did not restart.  Discussed patient 1st we will complete a C-peptide and cherry 65 that she is not to restart until C-peptide is complete.  Foot exam performed today see documentation.  eye exam fundus performed today.       Review of Systems   Constitutional:  Negative for activity change, appetite change and fatigue.   HENT:  Negative for dental problem, hearing loss, tinnitus, trouble swallowing and goiter.    Eyes:  Negative for photophobia, pain and visual disturbance.   Respiratory:  Negative for cough, chest tightness and  wheezing.    Cardiovascular:  Negative for chest pain, palpitations and leg swelling.   Gastrointestinal:  Negative for abdominal pain, constipation, diarrhea, nausea and reflux.   Endocrine: Negative for cold intolerance, heat intolerance, polydipsia and polyphagia.   Genitourinary:  Negative for difficulty urinating, flank pain, hematuria, hot flashes, menstrual irregularity, menstrual problem, nocturia and urgency.   Musculoskeletal:  Negative for back pain, gait problem, joint swelling, leg pain and joint deformity.   Integumentary:  Negative for color change, pallor, rash and breast discharge.   Allergic/Immunologic: Negative for environmental allergies, food allergies and immunocompromised state.   Neurological:  Negative for tremors, seizures, headaches, memory loss and coordination difficulties.   Psychiatric/Behavioral:  Negative for agitation, behavioral problems and sleep disturbance. The patient is not nervous/anxious.           Objective     Physical Exam  Constitutional:       General: She is not in acute distress.     Appearance: Normal appearance. She is not ill-appearing.   HENT:      Head: Normocephalic and atraumatic.      Right Ear: External ear normal.      Left Ear: External ear normal.      Nose: Nose normal. No congestion or rhinorrhea.      Mouth/Throat:      Mouth: Mucous membranes are moist.      Pharynx: Oropharynx is clear. No oropharyngeal exudate.   Eyes:      General:         Right eye: No discharge.         Left eye: No discharge.      Conjunctiva/sclera: Conjunctivae normal.      Pupils: Pupils are equal, round, and reactive to light.   Neck:      Thyroid: No thyroid mass, thyromegaly or thyroid tenderness.   Cardiovascular:      Rate and Rhythm: Normal rate and regular rhythm.      Pulses: Normal pulses.           Dorsalis pedis pulses are 2+ on the right side and 2+ on the left side.        Posterior tibial pulses are 2+ on the right side and 2+ on the left side.      Heart  sounds: Normal heart sounds. No murmur heard.  Pulmonary:      Effort: Pulmonary effort is normal. No respiratory distress.      Breath sounds: Normal breath sounds.   Abdominal:      General: Abdomen is flat. Bowel sounds are normal. There is no distension.      Palpations: Abdomen is soft.      Tenderness: There is no abdominal tenderness.   Musculoskeletal:         General: No swelling or tenderness. Normal range of motion.      Cervical back: Normal range of motion and neck supple. No tenderness.      Right lower leg: No edema.      Left lower leg: No edema.   Feet:      Right foot:      Protective Sensation: 5 sites tested.  5 sites sensed.      Skin integrity: Callus and dry skin present.      Toenail Condition: Right toenails are normal.      Left foot:      Protective Sensation: 5 sites tested.  5 sites sensed.      Skin integrity: Callus present.      Toenail Condition: Left toenails are normal.   Lymphadenopathy:      Cervical: No cervical adenopathy.   Skin:     General: Skin is warm and dry.      Coloration: Skin is not jaundiced or pale.   Neurological:      General: No focal deficit present.      Mental Status: She is alert and oriented to person, place, and time. Mental status is at baseline.      Coordination: Coordination normal.      Gait: Gait normal.   Psychiatric:         Mood and Affect: Mood normal.         Behavior: Behavior normal.         Thought Content: Thought content normal.            Assessment and Plan     1. Uncontrolled type 2 diabetes mellitus with hyperglycemia shows  Current A1C 9.2   A1C goal <7.0   Medications:  Jardiance 25 mg once a day (not filled since 04/01/2024), Tradjenta 5 mg once a day(not filled since 04/01/2024), Changes: Has Omnipod dash refer to DM ed to upgrade to Omnipod 5, C-peptid and PATRICIA-65 today then type regimen  Yearly Diabetic Eye Exam: 12/06/2024   Yearly Diabetic  Foot Exam: 12/06/2024      Dispensed Days Supply Quantity Provider Pharmacy    JARDIANCE  25 MG TABLET 04/01/2024 30 30 each [facility]   JARDIANCE 25 MG TABLET 03/05/2024 30 30 each [facility]   JARDIANCE 25 MG TABLET 02/03/2024 30 30 each [facility]   JARDIANCE 25 MG TABLET 01/06/2024 30 30 each [facility]     TRADJENTA 5 MG TABLET 04/01/2024 90 90 each [facility]   TRADJENTA 5 MG TABLET 03/05/2024 30 30 each [facility]   TRADJENTA 5 MG TABLET 02/03/2024 30 30 each [facility]   TRADJENTA 5 MG TABLET 01/06/2024 30 30 each [facility]   -     Ambulatory referral/consult to Endocrinology  -     C-Peptide; Future; Expected date: 12/06/2024  -     Comprehensive Metabolic Panel; Future; Expected date: 12/06/2024  -     GAD65 Ab, Serum; Future; Expected date: 12/06/2024  -     POCT HEMOGLOBIN A1C  -     Microalbumin/Creatinine Ratio, Urine  -     Ambulatory referral/consult to Diabetes Education    2. Fatigue, unspecified type  TSH, Free T4, Free T3 today   -     T4, Free; Future; Expected date: 12/06/2024  -     TSH; Future; Expected date: 12/06/2024  -     THYROID PEROXIDASE (TPO); Future; Expected date: 12/06/2024          Follow up in about 3 months (around 3/6/2025) for Type 2 diabetes, with Omnipod 5, w/ Dexcom.

## 2024-12-09 LAB — C PEPTIDE P FAST SERPL-MCNC: 0.6 NG/ML (ref 1.1–4.4)

## 2024-12-10 DIAGNOSIS — H35.00 RETINOPATHY: Primary | ICD-10-CM

## 2024-12-10 LAB — GAD65 AB SER-SCNC: 0 NMOL/L

## 2024-12-11 LAB — THYROID PEROXIDASE QUANT (OLG): 7 IU/ML

## 2024-12-13 ENCOUNTER — TELEPHONE (OUTPATIENT)
Dept: DIABETES | Facility: CLINIC | Age: 54
End: 2024-12-13
Payer: MEDICAID

## 2024-12-13 NOTE — TELEPHONE ENCOUNTER
Spoke with pt about transferring from Omnipod Dash to Omnipod 5 with Dexcom G6. She has not restarted her Dexcom. Was using Dexcom G6  in past. Instructed that she will need to download the Dexcom G6 ana on phone and set up account. Pt states that she has had some carb counting education before but would like to have a review. We decided it is best to meet for Dexcom G6 start and carb counting review before proceeding with Omnipod 5 start. Scheduled Dexcom G6 start and carb counting 12/23 and Omnipod start 12/26. States that she does have an Omnipod 5 controller.

## 2024-12-16 DIAGNOSIS — E11.65 UNCONTROLLED TYPE 2 DIABETES MELLITUS WITH HYPERGLYCEMIA: Primary | ICD-10-CM

## 2024-12-16 RX ORDER — INSULIN GLARGINE 100 [IU]/ML
INJECTION, SOLUTION SUBCUTANEOUS
Status: CANCELLED | OUTPATIENT
Start: 2024-12-16

## 2024-12-16 RX ORDER — INSULIN GLARGINE 100 [IU]/ML
45 INJECTION, SOLUTION SUBCUTANEOUS DAILY
Qty: 15 ML | Refills: 5 | Status: SHIPPED | OUTPATIENT
Start: 2024-12-16

## 2024-12-16 NOTE — TELEPHONE ENCOUNTER
Patient appointment is not until 12/23/2024 to start pump, out of Gen 4 dash supplies has been self injecting insulin .

## 2024-12-16 NOTE — TELEPHONE ENCOUNTER
Called and spoke with patient states Lantus was stopped in Oct. 2024 by PCP will send in refill to follow up

## 2024-12-23 ENCOUNTER — TELEPHONE (OUTPATIENT)
Dept: DIABETES | Facility: CLINIC | Age: 54
End: 2024-12-23
Payer: MEDICAID

## 2024-12-23 NOTE — TELEPHONE ENCOUNTER
Pt left VM to cancel appt today. Called pt back to determine if she will still be available 12/26/24 for Omnipod start. Pt did not answer and unable to leave VM.

## 2024-12-26 ENCOUNTER — TELEPHONE (OUTPATIENT)
Dept: DIABETES | Facility: CLINIC | Age: 54
End: 2024-12-26
Payer: MEDICAID

## 2024-12-26 NOTE — TELEPHONE ENCOUNTER
Pt called and stated that she found an affordable and compatible phone with Dexcom G6. However, it will not arrive until tomorrow. She will keep today's clinic visit to review carb counting and will return on 1/9/24 for Omnipod 5 start (first available at [facility], pt preferred clinic).

## 2024-12-26 NOTE — TELEPHONE ENCOUNTER
Spoke with pt about today's Omniopod 5 training and Dexcom G6 start. Pt states that her phone is not compatible with Dexcom G6 ana. I sent a list of compatible phones from Dexcom website. She will go to [employer] to research affordable phone options. Instructed to contact provider if she is unable to afford phone and has to go back to Omnipod Dash with Dexcom G6 . If she can find an affordable phone she will attend diabetes education today for training.

## 2025-01-09 ENCOUNTER — CLINICAL SUPPORT (OUTPATIENT)
Dept: DIABETES | Facility: CLINIC | Age: 55
End: 2025-01-09
Payer: MEDICAID

## 2025-01-09 VITALS — BODY MASS INDEX: 29.05 KG/M2 | WEIGHT: 164 LBS

## 2025-01-09 DIAGNOSIS — E10.9 TYPE 1 DIABETES MELLITUS WITHOUT COMPLICATION: Primary | ICD-10-CM

## 2025-01-09 PROCEDURE — G0108 DIAB MANAGE TRN  PER INDIV: HCPCS | Mod: PBBFAC | Performed by: DIETITIAN, REGISTERED

## 2025-01-09 NOTE — PROGRESS NOTES
Diabetes Care Specialist Progress Note  Author: Kateyln Simmons RD  Date: 1/9/2025    Intake    Program Intake  Reason for Diabetes Program Visit:: Initial Diabetes Assessment  Current diabetes risk level:: moderate  In the last month, have you used the ER or been admitted to the hospital: No    Current Diabetes Treatment: Insulin  Method of insulin delivery?: Insulin Pump  Type of Pump: OP Dash  Does patient have back-up plan?: Yes  Any problems obtaining supplies?: No    Continuous Glucose Monitoring  Patient has CGM: Yes  Personal CGM type:: dexcom G6 - does not want to start today and prefers to start G7    Lab Results   Component Value Date    HGBA1C 9.5 (H) 05/08/2024       Weight: 74.4 kg (164 lb)       Body mass index is 29.05 kg/m².    Lifestyle Coping Support & Clinical    Lifestyle/Coping/Support  Psychosocial/Coping Skills Assessment Completed: : No  Deffered due to:: Time  Area of need?: Deferred         Diabetes Self-Management Skills Assessment    Medication Skills Assessment  Patient is able to identify current diabetes medications, dosages, and appropriate timing of medications.: yes  Patient reports problems or concerns with current medication regimen.: yes  Medication regimen problems/concerns:: lack of training  Medication Skills Assessment Completed:: Yes  Assessment indicates:: Instruction Needed  Area of need?: Yes    Diabetes Disease Process/Treatment Options  Diabetes Disease Process/Treatment Options: Skills Assessment Completed: No  Deferred due to:: Time  Area of need?: Deferred    Nutrition/Healthy Eating  Nutrition/Healthy Eating Skills Assessment Completed:: Yes  Assessment indicates:: Instruction Needed  Area of need?: Yes    Physical Activity/Exercise  Physical Activity/Exercise Skills Assessment Completed: : No  Deffered due to:: Time  Area of need?: Deferred    Home Blood Glucose Monitoring  Patient states that blood sugar is checked at home daily.: yes  Monitoring Method:: home  glucometer  Personal CGM type:: dexcom G6 - does not want to start today and prefers to start G7  Home Blood Glucose Monitoring Skills Assessment Completed: : Yes  Assessment indicates:: Instruction Needed  Area of need?: Yes    Acute Complications  Acute Complications Skills Assessment Completed: : No  Deffered due to:: Time  Area of need?: Deferred    Chronic Complications  Chronic Complications Skills Assessment Completed: : No  Deferred due to:: Time  Area of need?: Deferred      Assessment Summary and Plan    Based on today's diabetes care assessment, the following areas of need were identified:      Identified Areas of Need      Medication/Current Diabetes Treatment: Yes - Pt presents with OP5 and Dexcom G6 for insulin pump start. Pt states that her previous endo provider ordered OP5 and her current endo provider ordered dexcom G6. Confirmed that her OP5 is compatible with G7. Downloaded G7 phone ana. Unable to start OP5 today due to needing settings and pt wanting G7 to start. Reached out to Endo provider who needs insulin doses for settings. Pt is still on DASH but did not bring PDM today for upload. Will return in two weeks with DASH and possible Dexcom G7 start. Reached out to current Endo provider for Dexcom Rx.    Lifestyle Coping/Support: Deferred   Diabetes Disease Process/Treatment Options: Deferred   Nutrition/Healthy Eating: Yes - see care plan. Has no prior experience with carb counting.   Physical Activity/Exercise: Deferred    Home Blood Glucose Monitoring: Yes - downloaded Dexcom G7 ana on phone. Pt reports that she prefers G7 over G6.   Acute Complications: Deferred    Chronic Complications: Deferred       Today's interventions were provided through individual discussion, instruction, and written materials were provided.      Patient verbalized understanding of instruction and written materials.  Pt was able to return back demonstration of instructions today. Patient understood key points,  needs reinforcement and further instruction.     Diabetes Self-Management Care Plan:    Today's Diabetes Self-Management Care Plan was developed with Donna's input. Donna has agreed to work toward the following goal(s) to improve his/her overall diabetes control.      Care Plan: Diabetes Management   Updates made since 1/10/2024 12:00 AM        Problem: Healthy Eating         Goal: Pt will use education materials and return in two weeks for carb counting practice    Start Date: 1/9/2025   Expected End Date: 1/23/2025   Priority: Medium   Barriers: No Barriers Identified   Note:    Provided carb counting materials and provided carb counting instruction and practice.       Task: Provided visual examples using dry measuring cups, food models, and other familiar objects such as computer mouse, deck or cards, tennis ball etc. to help with visualization of portions. Completed 1/9/2025        Task: Explained how to count carbohydrates using the food label and the use of dry measuring cups for accurate carb counting. Completed 1/9/2025          Follow Up Plan     Follow up in about 2 weeks (around 1/23/2025).    Today's care plan and follow up schedule was discussed with patient.  Donna verbalized understanding of the care plan, goals, and agrees to follow up plan.        The patient was encouraged to communicate with his/her health care provider/physician and care team regarding his/her condition(s) and treatment.  I provided the patient with my contact information today and encouraged to contact me via phone or [facility] Patient Portal as needed.     Length of Visit   Total Time: 90 Minutes

## 2025-01-10 ENCOUNTER — TELEPHONE (OUTPATIENT)
Dept: ENDOCRINOLOGY | Facility: CLINIC | Age: 55
End: 2025-01-10
Payer: MEDICAID

## 2025-01-10 DIAGNOSIS — E11.65 UNCONTROLLED TYPE 2 DIABETES MELLITUS WITH HYPERGLYCEMIA: Primary | ICD-10-CM

## 2025-01-10 RX ORDER — BLOOD-GLUCOSE SENSOR
EACH MISCELLANEOUS
Qty: 3 EACH | Refills: 11 | Status: SHIPPED | OUTPATIENT
Start: 2025-01-10

## 2025-01-10 NOTE — TELEPHONE ENCOUNTER
----- Message from Dietitian Katelyn sent at 1/9/2025  2:48 PM CST -----  She requests that you order G7 sensors sent to [facility] in [place]. She does have the G7 ana on her phone. She will restart her DASH at home today. I was able to connect her to nanoRETE. She is coming back in one week and can bring her DASH to upload her Glooko report. I can forward to you then for her insulin doses.  ----- Message -----  From: Khushbu Martinez NP  Sent: 1/9/2025   2:43 PM CST  To: Katelyn Simmons RD    This was ordered by her PCP and not me.  That is why it did not do her settings due to her not keeping insulin amounts and did not order the Omnipod.  This was supposed to be taken care of by the PCP.  I will have to know her insulin amounts to even do settings,  I usually do not do that on patient's that I do not order this on.  Not sure why her PCP did not handle this she order the Omnipods.  ----- Message -----  From: Katelyn Simmons RD  Sent: 1/9/2025   1:28 PM CST  To: Khushbu Martinez NP    Ms Thompson is here with her OP5 and her Dexcom G6. She prefers to use the Dexcom G7 and has pods that are compatible. I can start her on a Dexcom G7 today with a sample if you would like. She was able to download the G7 ana on her phone. I can also start her OP5 but I couldn't find settings in her chart and she does not have her DASH with her today. Do you have settings for her? If we can't get her settings today, I can start her Dexcom G7 and come back for OP5 training. She has her DASH at home and can resume it at home.

## 2025-01-14 ENCOUNTER — TELEPHONE (OUTPATIENT)
Dept: ENDOCRINOLOGY | Facility: CLINIC | Age: 55
End: 2025-01-14
Payer: MEDICAID

## 2025-01-14 ENCOUNTER — TELEPHONE (OUTPATIENT)
Dept: DIABETES | Facility: CLINIC | Age: 55
End: 2025-01-14
Payer: MEDICAID

## 2025-01-14 NOTE — TELEPHONE ENCOUNTER
Called pt to remind about bringing OP Dash PDM for appt tomorrow to upload Glooko report fof OP5 settings. Reminded that Dexcom G7 sensors was sent to pharmacy. She can  and start at visit tomorrow. Pt voiced understanding.   Date of Service: 09/01/2019    INFECTIOUS DISEASE INPATIENT CONSULTATION     REQUESTING PHYSICIAN:  Orlin Hunter MD.     CHIEF COMPLAINT:  Shortness of breath, low-grade fever.    HISTORY OF PRESENT ILLNESS:  This patient is a 60-year-old gentleman with underlying coronary artery disease, hypertension, atrial fibrillation, who presented 2 days ago and was admitted to the hospital with a complaint of shortness of breath that began On the evening of the 30th.  He had a cough productive of yellowish sputum.  States that about 10 days ago he was given a course of Azithromycin for a dental infection.  In addition to his cough and sputum production and shortness of breath, he also complains of having had some chills, mild shaking, some fatigue, weakness and diaphoresis.  O2 saturation when he got to the emergency room with 79% on room air.  White count was elevated at 15,000.  Lactic acid was 1.3.  BNP was 2150.  Troponin was 0.03.  Procalcitonin was 0.05.  He states his sputum is now dark yellow, somewhat mustard-like in color.  He has no chest pains.  He has been diuresed over the last 2 days and is feeling somewhat better.  His chest x-ray shows what to my eye looks more like congestion than pneumonia.    In the ED, he was started on antibiotics for possible pneumonia.  He was given Vancomycin and Aztreonam.  He had some sort of reaction in the ED.  There was swelling of his eyelids.  He states he does not have flushing.  He was then labeled as being allergic to Aztreonam and Vancomycin.    REVIEW OF SYSTEMS:  No headache, visual changes, sore throat.  He does have  a tooth that the dentist has been working on, this apparently was quite decayed and that is possible cause of infection as well.  No chest pain, hemoptysis, poor appetite, nausea, vomiting, diarrhea, constipation, abdominal pain, dysuria, hematuria, myalgias, arthralgias, rashes, joint swellings or neurologic complaints.    PAST MEDICAL HISTORY:  1.   Hypertension.  2.  Coronary artery disease.  3.  Atrial fibrillation.  4.  Sleep apnea.  5.  Hypothyroidism.  6.  Bariatric procedure.  7.  Prostate cancer.    ALLERGIES:  Chlorhexidine.  Lisinopril.    Penicillin caused a rash.    Aztreonam is listed, but I do not believe that this is true allergy.    Vancomycin is also now listed.  With details including eye swelling, this is probably a red man syndrome.  No history of prior Vancomycin exposure.    CURRENT MEDICATIONS:  Amiodarone.  Apixaban.  Aspirin.  Carvedilol.  Cyanocobalamin.  Digoxin.  Diltiazem.  Escitalopram.  Famotidine.  Furosemide.  Hydralazine.  Levothyroxine.  Losartan.  Metoprolol.    SOCIAL HISTORY:  Nonsmoker, does not drink excessive amounts of alcohol.    FAMILY HISTORY:  Father  of an MI at age 45.    No TB MRSA or c diff  TRAVEL HISTORY:   No unusual travel.     ANIMAL EXPOSURES:    No unusual animal exposures.      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    PHYSICAL EXAMINATION:  GENERAL:  Pleasant, alert gentleman in no acute distress, oriented x3.  Affect normal.  VITAL SIGNS:  Temperature on admission was 98 degrees, highest temperature since admission 100.2, which was yesterday around noon, currently 98 with a blood pressure of 167/96, heart rate of 81, respiratory rate of 16, O2 saturation on 2 liters of 94%.  SKIN:  Warm and dry.  No rashes, petechiae, splinter hemorrhages, Osler's nodes or Janeway's lesions.  HEENT:  NCAT, EOMI.  No subconjunctival hemorrhages.  No scleral icterus.  Sinuses are nontender.  Pharynx is clear.  Dentition in fairly good repair.  NECK:  Not stiff.  There is no adenopathy.  LUNGS:  Actually clear.  No rales, rhonchi or wheezes.    HEART:  Irregularly irregular, no S3.  ABDOMEN:  Soft, nontender, bowel sounds present, no masses, no hepatosplenomegaly, rebound or guarding.  EXTREMITIES:  No edema, clubbing,  cyanosis.  Pedal pulses are palpable.  NEUROLOGIC:  No gross focal abnormalities.  Cranial nerves intact.  Motor and sensory grossly normal.  Gait not tested.    LABORATORY DATA:  Normal electrolytes.  Blood gas on admission showed pH of 7.37, pCO2 45, pO2 64, O2 saturation 91%.  Chem panel:  Albumin was 3.8, alkaline phosphatase was 120, creatinine was 0.94 on admission, 0.9 today.  BNP has gone from 2150 to 3309 yesterday.  There is not one today.  Lactic acid 0.8.  Procalcitonin 0.05.  No CBC today.  White blood cell count yesterday was 14,400 with 85% neutrophils.  Respiratory pathogen panel is pending.  Respiratory culture is pending.  Sputum Gram stain showed acute inflammation and gram-positive cocci and that was sent yesterday.  Legionella and strep pneumo antigens are negative.  MRSA screen is negative.  Blood cultures are negative.  Urinalysis shows no evidence for infection.  Chest x-rays were reviewed and looks consistent with CHF.  EKG shows a prolonged QT, corrected QT.  Echocardiogram showed minimally increased LV cavity size, moderate LVH, severely decreased left ventricular systolic function, ejection fraction 18%, mild to moderate mitral regurgitation, normal pericardium without effusion, normal right ventricular systolic pressure.    IMPRESSION:  This patient is a 60-year-old male with known coronary artery disease and other comorbidities.  He presents with shortness of breath and some cough with some productive sputum.  He does not appear to have a lobar pneumonia on chest x-ray.  He does have low-grade temps.  He also has a leukocytosis.  It is my suspicion that this is a viral illness, possibly with associated myocarditis resulting in ejection fraction that is markedly reduced below his baseline.  The cause is not readily clear.  I believe he is starting to get somewhat better.  For now, we should cover him with antibiotics a couple more days until we get results of the sputum culture  back.    RECOMMENDATIONS:  1.  Will discontinue Levaquin since his QT interval is somewhat prolonged.  He is also on Amiodarone.  2.  Will use Ceftriaxone.  3.  I do not think we need to label him allergic to Aztreonam.  4.  Would consider checking off the allergic classification for Vancomycin as well.  5.  Await respiratory pathogen panel.  6.  Further recommendations to follow.      Dictated By: Chitra Thomson MD  Signing Provider: MD REJI Travis/chloe ([id number])  DD: 09/01/2019 11:03:07 TD: 09/01/2019 11:40:14    Copy Sent To:     cc: Orlin Hunter MD

## 2025-01-14 NOTE — TELEPHONE ENCOUNTER
----- Message from Alicia sent at 1/14/2025 11:27 AM CST -----  Patient of Khushbu    Patient called stating she is needing Omnipod 4 until she can get Omnipod 5.    [phone]  11:28  Thanks,

## 2025-01-15 ENCOUNTER — CLINICAL SUPPORT (OUTPATIENT)
Dept: DIABETES | Facility: CLINIC | Age: 55
End: 2025-01-15
Payer: MEDICAID

## 2025-01-15 DIAGNOSIS — E11.9 TYPE 2 DIABETES MELLITUS WITHOUT COMPLICATION, WITH LONG-TERM CURRENT USE OF INSULIN: Primary | ICD-10-CM

## 2025-01-15 DIAGNOSIS — Z79.4 TYPE 2 DIABETES MELLITUS WITHOUT COMPLICATION, WITH LONG-TERM CURRENT USE OF INSULIN: Primary | ICD-10-CM

## 2025-01-15 PROCEDURE — G0108 DIAB MANAGE TRN  PER INDIV: HCPCS | Mod: PBBFAC | Performed by: DIETITIAN, REGISTERED

## 2025-01-15 NOTE — PROGRESS NOTES
Diabetes Care Specialist Follow-up Note  Author: Katelyn Simmons RD  Date: 1/15/2025    Intake    Program Intake  Reason for Diabetes Program Visit:: Intervention  Type of Intervention:: Individual  Individual: Education  Education: Insulin Pump Evaluation    Current Diabetes Treatment: Insulin  Method of insulin delivery?: Insulin Pump  Type of Pump: OP Dash  Does patient have back-up plan?: Yes    Continuous Glucose Monitoring  Patient has CGM: Yes  Personal CGM type:: Dexcom G7 with phone ana (started today)    Lab Results   Component Value Date    HGBA1C 9.5 (H) 05/08/2024     A1c Pre Diabetes Care Specialist Intervention:  9.5%      SMBG: not prior to visit. Provided sample glucometer kit for Contour Next Gen  Fasting, n/a  Post prandial, n/a    Lifestyle Coping Support & Clinical    Lifestyle/Coping/Support  Does anyone in your family have diabetes or does anyone in your family support you in your diabetes care?: daughter  List anything about Diabetes that causes you stress?: injections  Psychosocial/Coping Skills Assessment Completed: : No  Deffered due to:: Time  Area of need?: Deferred         Diabetes Self-Management Skills Assessment    Medication Skills Assessment  Patient is  aware that some diabetes medications can cause low blood sugar?: Yes  Medication Skills Assessment Completed:: Yes  Assessment indicates:: Instruction Needed  Area of need?: Yes    Diabetes Disease Process/Treatment Options  Diabetes Disease Process/Treatment Options: Skills Assessment Completed: No  Deferred due to:: Time  Area of need?: Deferred    Nutrition/Healthy Eating  Nutrition/Healthy Eating Skills Assessment Completed:: Yes  Assessment indicates:: Instruction Needed  Area of need?: Yes    Physical Activity/Exercise  Physical Activity/Exercise Skills Assessment Completed: : No  Deffered due to:: Time  Area of need?: Deferred    Home Blood Glucose Monitoring  Personal CGM type:: Dexcom G7 with phone ana (started  today)  Home Blood Glucose Monitoring Skills Assessment Completed: : Yes  Assessment indicates:: Instruction Needed  Area of need?: Yes    Acute Complications  Acute Complications Skills Assessment Completed: : Yes  Assessment indicates:: Instruction Needed  Area of need?: Yes    Chronic Complications  Reviewed health maintenance: no (see comments)  Chronic Complications Skills Assessment Completed: : No  Deferred due to:: Time  Area of need?: Deferred      During today's follow-up visit,  the following areas required further assessment and content was provided/reviewed.    Based on today's diabetes care assessment, the following areas of need were identified:      Identified Areas of Need      Medication/Current Diabetes Treatment: Yes - Current OP Dash settings:  Basal Program:  12 am - 1.05 units  6:30am - 1.4 units  Bolus Settings:   Max Bolus - 20 units  Target BG - 120  Correct Above - 120  ICR - 6.6  ISF - 24  Reverse Correction - On  Insulin Action Duration - 3 hrs  Total Insulin for last 7 days:  1/15: 9.25 units  1/14: 30.6  1/13: 31.5  1/12: 31.15  1/11: 31.5  1/10: 31.5  1/9: 7.7 (started a new pod, was out of pods for a few days)   Lifestyle Coping/Support: Deferred   Diabetes Disease Process/Treatment Options: Deferred   Nutrition/Healthy Eating: Yes - see care plan   Physical Activity/Exercise: Deferred    Home Blood Glucose Monitoring: Yes - see care plan   Acute Complications: Yes - reviewed   Chronic Complications: Deferred       Today's interventions were provided through individual discussion, instruction, and written materials were provided.    Patient verbalized understanding of instruction and written materials.  Pt was able to return back demonstration of instructions today. Patient understood key points, needs reinforcement and further instruction.     Diabetes Self-Management Care Plan Review and Evaluation of Progress:    During today's follow-up Donna's Diabetes Self-Management Care Plan  "progress was reviewed and progress was evaluated including his/her input. Donna has agreed to continue his/her journey to improve/maintain overall diabetes control by continuing to set health goals. See care plan progress below.      Care Plan: Diabetes Management   Updates made since 1/16/2024 12:00 AM        Problem: Healthy Eating         Goal: Pt will use education materials and return in two weeks for carb counting practice    Start Date: 1/9/2025   Expected End Date: 1/30/2025   This Visit's Progress: On track   Priority: Medium   Barriers: No Barriers Identified   Note:    Provided carb counting materials and provided carb counting instruction and practice.    1/15/25 - pt starting entering # of servings of carbs into PDM. Reviewed converting # of carbs to grams of carbs. Pt states she will review her materials at home and will discuss at OP5 start.       Task: Provided visual examples using dry measuring cups, food models, and other familiar objects such as computer mouse, deck or cards, tennis ball etc. to help with visualization of portions. Completed 1/9/2025        Task: Explained how to count carbohydrates using the food label and the use of dry measuring cups for accurate carb counting. Completed 1/9/2025        Problem: Blood Glucose Self-Monitoring         Goal: Pt will use Dexcom for CGM and will use sensor glucose reading for bolusing in Omnipod Dash    Start Date: 1/15/2025   Expected End Date: 1/23/2025   Priority: High   Barriers: No Barriers Identified   Note:    1/15/25 - DEXCOM G7 CGM TRAINING  Dexcom G7 mobile apps downloaded to phone. Education was provided using "Quick Start Guide" and demo device per Dexcom protocol. [facility] data share set-up.    Patient will use Dexcom G7 ana to receive continuous glucose data.        Overview:  5 minute glucose reading updates, trending arrows, BG graph screens, reports screen,  connectivity and functions.   Menus: Trend graph, start " sensor, enter BG, events, alerts, settings, replace sensor, stop sensor, and shutdown  Dexcom G7 mobile ana/ Settings:                          * Urgent Low: 55 mg/dL                          * Urgent Low Soon: On                          * Low Alert: 70 mg/dL; Snooze off                          * High Alert: 250 mg/dL; Snooze off                           * Signal Loss: on                          * Brief Sensor Issue: on     Reviewed additional setting options.  Patient paired sensor using 4-digit code listed on sensor cap..    Reviewed where to find sensor insertion time and expiration date.   Reviewed appropriate calibration techniques.  Reviewed sensor site selection. Patient selected and prepped site using aseptic technique, inserted sensor, applied overlay tape and started session.   Reviewed sensor removal from site. Discussed times to remove sensor per  guidelines include MRI or diatherapy.   Patient able to demonstrate without difficulty. Encouraged to review manual and/or training videos prior to starting another sensor.   Reviewed problem solving aspects of sensor transmission/variables that can disrupt RF transmission.  range 20 feet, but the first 3 hrs keep within 3 feet of transmitter.  Patient instructed on lag time of interstitial fluid from CBG and was advised to treat hypoglycemia and dose insulin based on SMBG values.  Dexcom technical support contact number given and examples of when to contact them discussed.            Follow Up Plan     Follow up in about 1 week (around 1/22/2025) for OP5 start and carb counting review.    Today's care plan and follow up schedule was discussed with patient.  Donna verbalized understanding of the care plan, goals, and agrees to follow up plan.        The patient was encouraged to communicate with his/her health care provider/physician and care team regarding his/her condition(s) and treatment.  I provided the patient with my  contact information today and encouraged to contact me via phone or [facility] Patient Portal as needed.     Length of Visit   Total Time: 60 Minutes

## 2025-01-27 NOTE — TELEPHONE ENCOUNTER
Called to follow up with patient, states she has everything taken cared of start New Omnipod on Wednesday.

## 2025-01-28 ENCOUNTER — LAB VISIT (OUTPATIENT)
Dept: LAB | Facility: HOSPITAL | Age: 55
End: 2025-01-28
Attending: INTERNAL MEDICINE
Payer: MEDICAID

## 2025-01-28 DIAGNOSIS — Z11.59 SCREENING EXAMINATION FOR POLIOMYELITIS: ICD-10-CM

## 2025-01-28 DIAGNOSIS — E78.5 HYPERLIPIDEMIA, UNSPECIFIED HYPERLIPIDEMIA TYPE: ICD-10-CM

## 2025-01-28 DIAGNOSIS — I10 ESSENTIAL HYPERTENSION, MALIGNANT: ICD-10-CM

## 2025-01-28 DIAGNOSIS — E11.65 INADEQUATELY CONTROLLED DIABETES MELLITUS: ICD-10-CM

## 2025-01-28 DIAGNOSIS — M06.09 RHEUMATOID ARTHRITIS OF MULTIPLE SITES WITHOUT RHEUMATOID FACTOR: Primary | ICD-10-CM

## 2025-01-28 LAB
ALBUMIN SERPL-MCNC: 3.5 G/DL (ref 3.5–5)
ALBUMIN/GLOB SERPL: 0.7 RATIO (ref 1.1–2)
ALP SERPL-CCNC: 95 UNIT/L (ref 40–150)
ALT SERPL-CCNC: 15 UNIT/L (ref 0–55)
ANION GAP SERPL CALC-SCNC: 8 MEQ/L
AST SERPL-CCNC: 16 UNIT/L (ref 5–34)
BACTERIA #/AREA URNS AUTO: ABNORMAL /HPF
BASOPHILS # BLD AUTO: 0.04 X10(3)/MCL
BASOPHILS NFR BLD AUTO: 0.4 %
BILIRUB SERPL-MCNC: 0.2 MG/DL
BILIRUB UR QL STRIP.AUTO: NEGATIVE
BUN SERPL-MCNC: 15 MG/DL (ref 9.8–20.1)
CALCIUM SERPL-MCNC: 9.8 MG/DL (ref 8.4–10.2)
CHLORIDE SERPL-SCNC: 103 MMOL/L (ref 98–107)
CHOLEST SERPL-MCNC: 204 MG/DL
CHOLEST/HDLC SERPL: 6 {RATIO} (ref 0–5)
CLARITY UR: CLEAR
CO2 SERPL-SCNC: 28 MMOL/L (ref 22–29)
COLOR UR AUTO: YELLOW
CREAT SERPL-MCNC: 0.96 MG/DL (ref 0.55–1.02)
CREAT/UREA NIT SERPL: 16
EOSINOPHIL # BLD AUTO: 0.07 X10(3)/MCL (ref 0–0.9)
EOSINOPHIL NFR BLD AUTO: 0.7 %
ERYTHROCYTE [DISTWIDTH] IN BLOOD BY AUTOMATED COUNT: 14.8 % (ref 11.5–17)
ERYTHROCYTE [SEDIMENTATION RATE] IN BLOOD: 20 MM/HR (ref 0–20)
EST. AVERAGE GLUCOSE BLD GHB EST-MCNC: 168.6 MG/DL
GFR SERPLBLD CREATININE-BSD FMLA CKD-EPI: >60 ML/MIN/1.73/M2
GLOBULIN SER-MCNC: 5.2 GM/DL (ref 2.4–3.5)
GLUCOSE SERPL-MCNC: 75 MG/DL (ref 74–100)
GLUCOSE UR QL STRIP: NEGATIVE
HBA1C MFR BLD: 7.5 %
HCT VFR BLD AUTO: 43.3 % (ref 37–47)
HCV AB SERPL QL IA: NONREACTIVE
HDLC SERPL-MCNC: 32 MG/DL (ref 35–60)
HGB BLD-MCNC: 13.8 G/DL (ref 12–16)
HGB UR QL STRIP: ABNORMAL
IMM GRANULOCYTES # BLD AUTO: 0.04 X10(3)/MCL (ref 0–0.04)
IMM GRANULOCYTES NFR BLD AUTO: 0.4 %
KETONES UR QL STRIP: NEGATIVE
LDLC SERPL CALC-MCNC: 139 MG/DL (ref 50–140)
LEUKOCYTE ESTERASE UR QL STRIP: ABNORMAL
LYMPHOCYTES # BLD AUTO: 3.41 X10(3)/MCL (ref 0.6–4.6)
LYMPHOCYTES NFR BLD AUTO: 32 %
MCH RBC QN AUTO: 27.4 PG (ref 27–31)
MCHC RBC AUTO-ENTMCNC: 31.9 G/DL (ref 33–36)
MCV RBC AUTO: 86.1 FL (ref 80–94)
MICROALBUMIN UR-MCNC: 21.4 UG/ML
MONOCYTES # BLD AUTO: 0.41 X10(3)/MCL (ref 0.1–1.3)
MONOCYTES NFR BLD AUTO: 3.8 %
NEUTROPHILS # BLD AUTO: 6.68 X10(3)/MCL (ref 2.1–9.2)
NEUTROPHILS NFR BLD AUTO: 62.7 %
NITRITE UR QL STRIP: NEGATIVE
NRBC BLD AUTO-RTO: 0 %
PH UR STRIP: 5.5 [PH]
PLATELET # BLD AUTO: 381 X10(3)/MCL (ref 130–400)
PMV BLD AUTO: 9.8 FL (ref 7.4–10.4)
POTASSIUM SERPL-SCNC: 4.6 MMOL/L (ref 3.5–5.1)
PROT SERPL-MCNC: 8.7 GM/DL (ref 6.4–8.3)
PROT UR QL STRIP: NEGATIVE
RBC # BLD AUTO: 5.03 X10(6)/MCL (ref 4.2–5.4)
RBC #/AREA URNS AUTO: ABNORMAL /HPF
SODIUM SERPL-SCNC: 139 MMOL/L (ref 136–145)
SP GR UR STRIP.AUTO: 1.02 (ref 1–1.03)
SQUAMOUS #/AREA URNS AUTO: ABNORMAL /HPF
TRICHOMONAS UR QL COMP ASSIST: ABNORMAL /HPF
TRIGL SERPL-MCNC: 167 MG/DL (ref 37–140)
TSH SERPL-ACNC: 2.1 UIU/ML (ref 0.35–4.94)
UROBILINOGEN UR STRIP-ACNC: 0.2
VLDLC SERPL CALC-MCNC: 33 MG/DL
WBC # BLD AUTO: 10.65 X10(3)/MCL (ref 4.5–11.5)
WBC #/AREA URNS AUTO: ABNORMAL /HPF

## 2025-01-28 PROCEDURE — 84443 ASSAY THYROID STIM HORMONE: CPT

## 2025-01-28 PROCEDURE — 85025 COMPLETE CBC W/AUTO DIFF WBC: CPT

## 2025-01-28 PROCEDURE — 80061 LIPID PANEL: CPT

## 2025-01-28 PROCEDURE — 80053 COMPREHEN METABOLIC PANEL: CPT

## 2025-01-28 PROCEDURE — 85652 RBC SED RATE AUTOMATED: CPT

## 2025-01-28 PROCEDURE — 81003 URINALYSIS AUTO W/O SCOPE: CPT

## 2025-01-28 PROCEDURE — 83036 HEMOGLOBIN GLYCOSYLATED A1C: CPT

## 2025-01-28 PROCEDURE — 36415 COLL VENOUS BLD VENIPUNCTURE: CPT

## 2025-01-28 PROCEDURE — 86803 HEPATITIS C AB TEST: CPT

## 2025-01-28 PROCEDURE — 82043 UR ALBUMIN QUANTITATIVE: CPT

## 2025-01-29 ENCOUNTER — CLINICAL SUPPORT (OUTPATIENT)
Dept: DIABETES | Facility: CLINIC | Age: 55
End: 2025-01-29
Payer: MEDICAID

## 2025-01-29 VITALS — WEIGHT: 171.81 LBS | BODY MASS INDEX: 30.43 KG/M2

## 2025-01-29 DIAGNOSIS — E10.9 TYPE 1 DIABETES MELLITUS WITHOUT COMPLICATION: Primary | ICD-10-CM

## 2025-01-29 PROCEDURE — G0108 DIAB MANAGE TRN  PER INDIV: HCPCS | Mod: PBBFAC | Performed by: DIETITIAN, REGISTERED

## 2025-01-29 NOTE — PROGRESS NOTES
Diabetes Care Specialist Follow-up Note  Author: Katelyn Simmons RD  Date: 1/29/2025    Intake    Program Intake  Reason for Diabetes Program Visit:: Intervention  Type of Intervention:: Individual  Individual: Device Training  Device Training: Insulin Pump Start    Current Diabetes Treatment: Insulin  Method of insulin delivery?: Insulin Pump  Type of Pump: OP Dash to OP5  Does patient have back-up plan?: Yes  Any problems obtaining supplies?: No    Continuous Glucose Monitoring  Personal CGM type:: Dexcom G7 with phone ana    Lab Results   Component Value Date    HGBA1C 7.5 (H) 01/28/2025     A1c Pre Diabetes Care Specialist Intervention:  7.5%      Weight: 77.9 kg (171 lb 12.8 oz)       Body mass index is 30.43 kg/m².  Wt stable      Lifestyle Coping Support & Clinical    Lifestyle/Coping/Support  Does anyone in your family have diabetes or does anyone in your family support you in your diabetes care?: daughter and   List anything about Diabetes that causes you stress?: needles  Psychosocial/Coping Skills Assessment Completed: : Yes  Assessment indicates:: Adequate understanding  Area of need?: No         Diabetes Self-Management Skills Assessment    Medication Skills Assessment  Medication Skills Assessment Completed:: Yes  Assessment indicates:: Instruction Needed  Area of need?: Yes    Diabetes Disease Process/Treatment Options  Diabetes Disease Process/Treatment Options: Skills Assessment Completed: No  Deferred due to:: Time  Area of need?: Deferred    Nutrition/Healthy Eating  Meal Plan 24 Hour Recall - Breakfast: 2 pieces of toast with jelly and coffee wtih 2 tsp sugar  Meal Plan 24 Hour Recall - Dinner: 2 cups rice, meat and red beans  Meal Plan 24 Hour Recall - Beverage: 1 cherry pepsi per day and water  Nutrition/Healthy Eating Skills Assessment Completed:: Yes  Assessment indicates:: Instruction Needed  Area of need?: Yes    Physical Activity/Exercise  Physical Activity/Exercise Skills  Assessment Completed: : No  Deffered due to:: Time  Area of need?: Deferred    Home Blood Glucose Monitoring  Patient states that blood sugar is checked at home daily.: yes  Personal CGM type:: Dexcom G7 with phone ana  Home Blood Glucose Monitoring Skills Assessment Completed: : Yes  Assessment indicates:: Instruction Needed  Area of need?: Yes    Acute Complications  Have you ever had hypoglycemia (low BG 70 or less)?: yes  Acute Complications Skills Assessment Completed: : Yes  Assessment indicates:: Instruction Needed  Area of need?: Yes    Chronic Complications  Reviewed health maintenance: no (see comments)  Chronic Complications Skills Assessment Completed: : No  Deferred due to:: Time  Area of need?: Deferred      During today's follow-up visit,  the following areas required further assessment and content was provided/reviewed.    Based on today's diabetes care assessment, the following areas of need were identified:      Identified Areas of Need      Medication/Current Diabetes Treatment: Yes - see care plan   Lifestyle Coping/Support: No   Diabetes Disease Process/Treatment Options: Deferred   Nutrition/Healthy Eating: Yes - provided carb counting guide, reviewed and saved some of her favorite meals to carb diary   Physical Activity/Exercise: Deferred    Home Blood Glucose Monitoring: Yes - reviewed how to change sensor in controller. Reviewed Automated mode   Acute Complications: Yes - reviewed correcting hypoglycemia may require less carbs due to insulin pause.    Chronic Complications: Deferred       Today's interventions were provided through individual discussion, instruction, and written materials were provided.    Patient verbalized understanding of instruction and written materials.  Pt was able to return back demonstration of instructions today. Patient understood key points, needs reinforcement and further instruction.     Diabetes Self-Management Care Plan Review and Evaluation of  "Progress:    During today's follow-up Donna's Diabetes Self-Management Care Plan progress was reviewed and progress was evaluated including his/her input. Donna has agreed to continue his/her journey to improve/maintain overall diabetes control by continuing to set health goals. See care plan progress below.      Care Plan: Diabetes Management   Updates made since 1/30/2024 12:00 AM        Problem: Healthy Eating Resolved 1/29/2025        Goal: Pt will use education materials and return in two weeks for carb counting practice Completed 1/29/2025   Start Date: 1/9/2025   Expected End Date: 1/30/2025   This Visit's Progress: Met   Recent Progress: On track   Priority: Medium   Barriers: No Barriers Identified   Note:    Provided carb counting materials and provided carb counting instruction and practice.    1/15/25 - pt starting entering # of servings of carbs into PDM. Reviewed converting # of carbs to grams of carbs. Pt states she will review her materials at home and will discuss at OP5 start.       Task: Provided visual examples using dry measuring cups, food models, and other familiar objects such as computer mouse, deck or cards, tennis ball etc. to help with visualization of portions. Completed 1/9/2025        Task: Explained how to count carbohydrates using the food label and the use of dry measuring cups for accurate carb counting. Completed 1/9/2025        Problem: Blood Glucose Self-Monitoring Resolved 1/29/2025        Goal: Pt will use Dexcom for CGM and will use sensor glucose reading for bolusing in Omnipod Dash Completed 1/29/2025   Start Date: 1/15/2025   Expected End Date: 1/23/2025   This Visit's Progress: Met   Priority: High   Barriers: No Barriers Identified   Note:    1/15/25 - DEXCOM G7 CGM TRAINING  Dexcom G7 mobile apps downloaded to phone. Education was provided using "Quick Start Guide" and demo device per Dexcom protocol. [facility] data share set-up.    Patient will use Dexcom G7 ana " to receive continuous glucose data.        Overview:  5 minute glucose reading updates, trending arrows, BG graph screens, reports screen,  connectivity and functions.   Menus: Trend graph, start sensor, enter BG, events, alerts, settings, replace sensor, stop sensor, and shutdown  Dexcom G7 mobile ana/ Settings:                          * Urgent Low: 55 mg/dL                          * Urgent Low Soon: On                          * Low Alert: 70 mg/dL; Snooze off                          * High Alert: 250 mg/dL; Snooze off                           * Signal Loss: on                          * Brief Sensor Issue: on     Reviewed additional setting options.  Patient paired sensor using 4-digit code listed on sensor cap..    Reviewed where to find sensor insertion time and expiration date.   Reviewed appropriate calibration techniques.  Reviewed sensor site selection. Patient selected and prepped site using aseptic technique, inserted sensor, applied overlay tape and started session.   Reviewed sensor removal from site. Discussed times to remove sensor per  guidelines include MRI or diatherapy.   Patient able to demonstrate without difficulty. Encouraged to review manual and/or training videos prior to starting another sensor.   Reviewed problem solving aspects of sensor transmission/variables that can disrupt RF transmission.  range 20 feet, but the first 3 hrs keep within 3 feet of transmitter.  Patient instructed on lag time of interstitial fluid from CBG and was advised to treat hypoglycemia and dose insulin based on SMBG values.  Dexcom technical support contact number given and examples of when to contact them discussed.          Problem: Medications         Goal: Pt will use Omnipod 5 for AID    Start Date: 1/29/2025   Expected End Date: 2/12/2025   Priority: High   Barriers: No Barriers Identified   Note:      OMNIPOD 5 INSULIN PUMP START    Patient here today for insulin  pump training and will be starting an OmniPod Insulin pump.  Pump training was provided per OmniPod protocol.    Pt removed OP Dash right before pump start. BG was 71 at start.      Details of pump therapy were covered to include basic features of PDM programming, filling of pod / reservoir, automatic pod priming and insertion, use of the dexcom sensor, setting basal, bolus and other features in the set up menu.  Pt demonstrated the ability to program PDM and fill pod reservoir with rapid acting insulin, prime infusion set and inserted pod to right abdomen    Instructed on use of basic pump features. Reviewed site selection of pods, rotation of sites and hard stop on PDM to change every 72 hrs + low insulin in resevoir.   Instructed that insulin vial is good out of refrigeration for 30 days.   Reviewed treatment of hypoglycemia, hyperglycemia; sick day care, DKA and troubleshooting of pump.  Omni Pod 24 hour support line provided.   Patient connected on [employer].     INITIAL SETTINGS:  Basal rate: 12am 1.05 units/hr                    6:30am 1.4 units  Maximum basal rate: 2.2 u/hr    Bolus Menu:    Blood glucose Targets:  12AM-12AM 120    Correction factor:  12AM- 12AM = 25    Carb Ratio   12AM- 12AM = 7    Active insulin time: 3 hours  Maximum bolus = 20 units  Reverse Correction: on   Auto Off: off  Min Bolus Efren : 120    Written materials provided. Patient/caregiver verbalized understanding of all instructions given.          Follow Up Plan     Follow up in about 2 weeks (around 2/12/2025) for OP5 2 week f/u.    Today's care plan and follow up schedule was discussed with patient.  Donna verbalized understanding of the care plan, goals, and agrees to follow up plan.        The patient was encouraged to communicate with his/her health care provider/physician and care team regarding his/her condition(s) and treatment.  I provided the patient with my contact information today and encouraged to contact me via phone or  [facility] Patient Portal as needed.     Length of Visit   Total Time: 120 Minutes

## 2025-02-03 ENCOUNTER — TELEPHONE (OUTPATIENT)
Dept: DIABETES | Facility: CLINIC | Age: 55
End: 2025-02-03
Payer: MEDICAID

## 2025-02-03 NOTE — TELEPHONE ENCOUNTER
Left VM to follow-up on Omnipod 5 training and left a reminder to change out infusion set and cartridge every 3 days.

## 2025-02-19 ENCOUNTER — CLINICAL SUPPORT (OUTPATIENT)
Dept: DIABETES | Facility: CLINIC | Age: 55
End: 2025-02-19
Payer: MEDICAID

## 2025-02-19 VITALS — BODY MASS INDEX: 30.29 KG/M2 | WEIGHT: 171 LBS

## 2025-02-19 DIAGNOSIS — E11.9 TYPE 2 DIABETES MELLITUS WITHOUT COMPLICATION, WITH LONG-TERM CURRENT USE OF INSULIN: Primary | ICD-10-CM

## 2025-02-19 DIAGNOSIS — Z79.4 TYPE 2 DIABETES MELLITUS WITHOUT COMPLICATION, WITH LONG-TERM CURRENT USE OF INSULIN: Primary | ICD-10-CM

## 2025-02-19 PROCEDURE — G0108 DIAB MANAGE TRN  PER INDIV: HCPCS | Mod: PBBFAC | Performed by: DIETITIAN, REGISTERED

## 2025-02-19 PROCEDURE — 99211 OFF/OP EST MAY X REQ PHY/QHP: CPT | Mod: PBBFAC | Performed by: DIETITIAN, REGISTERED

## 2025-02-19 NOTE — Clinical Note
"Saw pt today for Omnipod 5 start follow-up (see media for report). Noted pattern of overnight highs. Instructed how to give correction bolus for hyperglycemia.  Instructed to give correction bolus before bed. States she noted hyperglycemia during her PT session 2/18 afternoon. States she was in pain. Instructed to give correction bolus. Noted periods of manual mode. Pt states she was having trouble with pod and sensor connectivity and went through 3 pods attempting to connect. She contacted customer support who helped her trouble shoot. Determined that she had not entered sensor code and SN under "manage sensor". [employer] sent her replacement pods. She was also having problems with sensor and pod adhesion. She found over-patches on [employer] that she likes. She will use non-moisturizing soap. She will follow-up with you in 1 mth."

## 2025-02-19 NOTE — PROGRESS NOTES
Diabetes Care Specialist Follow-up Note  Author: Katelyn Simmons RD  Date: 2/19/2025    Intake    Program Intake  Reason for Diabetes Program Visit:: Intervention  Type of Intervention:: Individual  Individual: Education  Education: Self-Management Skill Review  Device Training: Insulin Pump Start    Current Diabetes Treatment: Insulin  Method of insulin delivery?: Insulin Pump  Type of Pump: Omnipod 5  Does patient have back-up plan?: Yes  Any problems obtaining supplies?: No    Continuous Glucose Monitoring  Personal CGM type:: Dexcom G7 with phone ana    Lab Results   Component Value Date    HGBA1C 7.5 (H) 01/28/2025     A1c Pre Diabetes Care Specialist Intervention:  9.2%      Weight: 77.6 kg (171 lb)       Body mass index is 30.29 kg/m².    Lifestyle Coping Support & Clinical       Diabetes Self-Management Skills Assessment    Medication Skills Assessment  Patient reports problems or concerns with current medication regimen.: yes  Medication regimen problems/concerns:: lack of training  Patient is  aware that some diabetes medications can cause low blood sugar?: Yes  Medication Skills Assessment Completed:: Yes  Assessment indicates:: Instruction Needed  Area of need?: Yes    Diabetes Disease Process/Treatment Options  Diabetes Type?: Type II  What are your goals for this education session?: OP5 f/u  Does patient understand the pathophysiology of diabetes?: No  Diabetes Disease Process/Treatment Options: Skills Assessment Completed: Yes  Assessment indicates:: Instruction Needed  Area of need?: Yes    Nutrition/Healthy Eating  Meal Plan 24 Hour Recall - Breakfast: toast, jelly and coffee  Meal Plan 24 Hour Recall - Dinner: chicken and sausage gumbo w/out rice, crackers  Meal Plan 24 Hour Recall - Snack: doritos, little debbies or ritz crackers  Meal Plan 24 Hour Recall - Beverage: water, pepsi a few per week  Nutrition/Healthy Eating Skills Assessment Completed:: Yes  Assessment indicates:: Instruction  Needed  Area of need?: Yes    Physical Activity/Exercise  Physical Activity/Exercise Skills Assessment Completed: : No  Deffered due to:: Time  Area of need?: Deferred    Home Blood Glucose Monitoring  Personal CGM type:: Dexcom G7 with phone ana    Acute Complications  Have you ever had hypoglycemia (low BG 70 or less)?: yes  How often and what are your symptoms?: sweaty  How do you treat hypoglycemia?: orange juice, 1/2 cup  Have you ever had hyperglycemia (high  or more)?: yes  How often and what are your symptoms?: thirst and urination  How do you treat hyperglycemia? : water and walking  Acute Complications Skills Assessment Completed: : Yes  Assessment indicates:: Instruction Needed  Area of need?: Yes    Chronic Complications  Reviewed health maintenance: no (see comments)  Chronic Complications Skills Assessment Completed: : No  Deferred due to:: Time  Area of need?: Deferred      During today's follow-up visit,  the following areas required further assessment and content was provided/reviewed.    Based on today's diabetes care assessment, the following areas of need were identified:      Identified Areas of Need      Medication/Current Diabetes Treatment: Yes - see care plan   Lifestyle Coping/Support:     Diabetes Disease Process/Treatment Options: Yes - Reviewed diabetes pathophysiology, different types of diabetes, signs and symptoms, risk factors and treatment guidelines.    Nutrition/Healthy Eating: Yes - reviewed some of her favorite snacks and assisted with adding to [employer] on her controller. Instructed to bolus for snacks.   Physical Activity/Exercise: Deferred    Home Blood Glucose Monitoring:      Acute Complications: Yes - reviewed treating for hypoglycemia and do not enter carbs when correcting hypoglycemia   Chronic Complications: Deferred       Today's interventions were provided through individual discussion, instruction, and written materials were provided.    Patient verbalized  understanding of instruction and written materials.  Pt was able to return back demonstration of instructions today. Patient understood key points, needs reinforcement and further instruction.     Diabetes Self-Management Care Plan Review and Evaluation of Progress:    During today's follow-up Ivans Diabetes Self-Management Care Plan progress was reviewed and progress was evaluated including his/her input. Donna has agreed to continue his/her journey to improve/maintain overall diabetes control by continuing to set health goals. See care plan progress below.      Care Plan: Diabetes Management   Updates made since 2/20/2024 12:00 AM        Problem: Healthy Eating Resolved 1/29/2025        Goal: Pt will use education materials and return in two weeks for carb counting practice Completed 1/29/2025   Start Date: 1/9/2025   Expected End Date: 1/30/2025   This Visit's Progress: Met   Recent Progress: On track   Priority: Medium   Barriers: No Barriers Identified   Note:    Provided carb counting materials and provided carb counting instruction and practice.    1/15/25 - pt starting entering # of servings of carbs into PDM. Reviewed converting # of carbs to grams of carbs. Pt states she will review her materials at home and will discuss at OP5 start.       Task: Provided visual examples using dry measuring cups, food models, and other familiar objects such as computer mouse, deck or cards, tennis ball etc. to help with visualization of portions. Completed 1/9/2025        Task: Explained how to count carbohydrates using the food label and the use of dry measuring cups for accurate carb counting. Completed 1/9/2025        Problem: Blood Glucose Self-Monitoring Resolved 1/29/2025        Goal: Pt will use Dexcom for CGM and will use sensor glucose reading for bolusing in Omnipod Dash Completed 1/29/2025   Start Date: 1/15/2025   Expected End Date: 1/23/2025   This Visit's Progress: Met   Priority: High   Barriers: No  "Barriers Identified   Note:    1/15/25 - DEXInfraReDx G7 CGM TRAINING  Dexcom G7 mobile apps downloaded to phone. Education was provided using "Quick Start Guide" and demo device per Dexcom protocol. [facility] data share set-up.    Patient will use Dexcom G7 ana to receive continuous glucose data.        Overview:  5 minute glucose reading updates, trending arrows, BG graph screens, reports screen,  connectivity and functions.   Menus: Trend graph, start sensor, enter BG, events, alerts, settings, replace sensor, stop sensor, and shutdown  Dexcom G7 mobile ana/ Settings:                          * Urgent Low: 55 mg/dL                          * Urgent Low Soon: On                          * Low Alert: 70 mg/dL; Snooze off                          * High Alert: 250 mg/dL; Snooze off                           * Signal Loss: on                          * Brief Sensor Issue: on     Reviewed additional setting options.  Patient paired sensor using 4-digit code listed on sensor cap..    Reviewed where to find sensor insertion time and expiration date.   Reviewed appropriate calibration techniques.  Reviewed sensor site selection. Patient selected and prepped site using aseptic technique, inserted sensor, applied overlay tape and started session.   Reviewed sensor removal from site. Discussed times to remove sensor per  guidelines include MRI or diatherapy.   Patient able to demonstrate without difficulty. Encouraged to review manual and/or training videos prior to starting another sensor.   Reviewed problem solving aspects of sensor transmission/variables that can disrupt RF transmission.  range 20 feet, but the first 3 hrs keep within 3 feet of transmitter.  Patient instructed on lag time of interstitial fluid from CBG and was advised to treat hypoglycemia and dose insulin based on SMBG values.  Dexcom technical support contact number given and examples of when to contact them " discussed.          Problem: Medications         Goal: Pt will use Omnipod 5 for AID Completed 2/19/2025   Start Date: 1/29/2025   Expected End Date: 2/12/2025   This Visit's Progress: Met   Priority: High   Barriers: No Barriers Identified   Note:      OMNIPOD 5 INSULIN PUMP START    Patient here today for insulin pump training and will be starting an OmniPod Insulin pump.  Pump training was provided per OmniPod protocol.    Pt removed OP Dash right before pump start. BG was 71 at start.      Details of pump therapy were covered to include basic features of PDM programming, filling of pod / reservoir, automatic pod priming and insertion, use of the dexcom sensor, setting basal, bolus and other features in the set up menu.  Pt demonstrated the ability to program PDM and fill pod reservoir with rapid acting insulin, prime infusion set and inserted pod to right abdomen    Instructed on use of basic pump features. Reviewed site selection of pods, rotation of sites and hard stop on PDM to change every 72 hrs + low insulin in resevoir.   Instructed that insulin vial is good out of refrigeration for 30 days.   Reviewed treatment of hypoglycemia, hyperglycemia; sick day care, DKA and troubleshooting of pump.  Omni Pod 24 hour support line provided.   Patient connected on [employer].     INITIAL SETTINGS:  Basal rate: 12am 1.05 units/hr                    6:30am 1.4 units  Maximum basal rate: 2.2 u/hr    Bolus Menu:    Blood glucose Targets:  12AM-12AM 120    Correction factor:  12AM- 12AM = 25    Carb Ratio   12AM- 12AM = 7    Active insulin time: 3 hours  Maximum bolus = 20 units  Reverse Correction: on   Auto Off: off  Min Bolus Efren : 120    Written materials provided. Patient/caregiver verbalized understanding of all instructions given.        Goal: Patient agrees to give meal and correction bolusing to improve hyperglycemia by next office visit    Start Date: 2/19/2025   Expected End Date: 3/31/2025   Barriers: No Barriers  "Identified   Note:    2/19/25 - instructed how to give correction bolus for hyperglycemia. Noted pattern of overnight highs. Instructed to give correction bolus before bed. States she noted hyperglycemia during her PT session 2/18 afternoon. States she was in pain. Instructed to give correction bolus. Noted periods of manual mode. Pt states she was having trouble with pod and sensor connectivity and went through 3 pods attempting to connect. She contacted customer support who helped her trouble shoot. Determined that she had not entered sensor code and SN under "manage sensor". [employer] sent her replacement pods. She was also having problems with sensor and pod adhesion. She found over-patches on [employer] that she likes. She will use non-moisturizing soap.         Follow Up Plan     Follow up in about 2 months (around 4/19/2025).    Today's care plan and follow up schedule was discussed with patient.  Donna verbalized understanding of the care plan, goals, and agrees to follow up plan.        The patient was encouraged to communicate with his/her health care provider/physician and care team regarding his/her condition(s) and treatment.  I provided the patient with my contact information today and encouraged to contact me via phone or [facility] Patient Portal as needed.     Length of Visit   Total Time: 75 Minutes     "

## 2025-03-17 ENCOUNTER — TELEPHONE (OUTPATIENT)
Dept: DIABETES | Facility: CLINIC | Age: 55
End: 2025-03-17
Payer: MEDICAID

## 2025-03-17 NOTE — TELEPHONE ENCOUNTER
Called pt - no answer. I see she is on your schedule for this Thursday. I will be at [facility] on Thursday so I won't be able to see her then. Could you ask her to contact me and I can schedule her sooner than April if she still needs help? [phone]. Thanks!    ----- Message from Khushbu Martinez NP sent at 3/6/2025  8:31 PM CST -----  She is seeing me on 03/06/2025. It looks like she needs further education on staying in automode. Please follow up sooner on her appt to reeducate her

## 2025-03-19 ENCOUNTER — TELEPHONE (OUTPATIENT)
Dept: ENDOCRINOLOGY | Facility: CLINIC | Age: 55
End: 2025-03-19
Payer: MEDICAID

## 2025-03-20 ENCOUNTER — LAB VISIT (OUTPATIENT)
Dept: LAB | Facility: HOSPITAL | Age: 55
End: 2025-03-20
Attending: NURSE PRACTITIONER
Payer: MEDICAID

## 2025-03-20 ENCOUNTER — OFFICE VISIT (OUTPATIENT)
Dept: ENDOCRINOLOGY | Facility: CLINIC | Age: 55
End: 2025-03-20
Payer: MEDICAID

## 2025-03-20 VITALS
BODY MASS INDEX: 30.88 KG/M2 | WEIGHT: 174.25 LBS | HEART RATE: 93 BPM | TEMPERATURE: 98 F | RESPIRATION RATE: 12 BRPM | HEIGHT: 63 IN | DIASTOLIC BLOOD PRESSURE: 80 MMHG | SYSTOLIC BLOOD PRESSURE: 133 MMHG

## 2025-03-20 DIAGNOSIS — E11.65 UNCONTROLLED TYPE 2 DIABETES MELLITUS WITH HYPERGLYCEMIA: ICD-10-CM

## 2025-03-20 DIAGNOSIS — E11.65 UNCONTROLLED TYPE 2 DIABETES MELLITUS WITH HYPERGLYCEMIA: Primary | ICD-10-CM

## 2025-03-20 LAB
ANION GAP SERPL CALC-SCNC: 6 MEQ/L
BUN SERPL-MCNC: 12.8 MG/DL (ref 9.8–20.1)
CALCIUM SERPL-MCNC: 9.6 MG/DL (ref 8.4–10.2)
CHLORIDE SERPL-SCNC: 102 MMOL/L (ref 98–107)
CO2 SERPL-SCNC: 29 MMOL/L (ref 22–29)
CREAT SERPL-MCNC: 0.92 MG/DL (ref 0.55–1.02)
CREAT/UREA NIT SERPL: 14
GFR SERPLBLD CREATININE-BSD FMLA CKD-EPI: >60 ML/MIN/1.73/M2
GLUCOSE SERPL-MCNC: 126 MG/DL (ref 74–100)
POTASSIUM SERPL-SCNC: 4.3 MMOL/L (ref 3.5–5.1)
SODIUM SERPL-SCNC: 137 MMOL/L (ref 136–145)

## 2025-03-20 PROCEDURE — 1160F RVW MEDS BY RX/DR IN RCRD: CPT | Mod: CPTII,,, | Performed by: NURSE PRACTITIONER

## 2025-03-20 PROCEDURE — 3061F NEG MICROALBUMINURIA REV: CPT | Mod: CPTII,,, | Performed by: NURSE PRACTITIONER

## 2025-03-20 PROCEDURE — 3066F NEPHROPATHY DOC TX: CPT | Mod: CPTII,,, | Performed by: NURSE PRACTITIONER

## 2025-03-20 PROCEDURE — 80048 BASIC METABOLIC PNL TOTAL CA: CPT

## 2025-03-20 PROCEDURE — 1159F MED LIST DOCD IN RCRD: CPT | Mod: CPTII,,, | Performed by: NURSE PRACTITIONER

## 2025-03-20 PROCEDURE — 99214 OFFICE O/P EST MOD 30 MIN: CPT | Mod: S$PBB,,, | Performed by: NURSE PRACTITIONER

## 2025-03-20 PROCEDURE — 86337 INSULIN ANTIBODIES: CPT

## 2025-03-20 PROCEDURE — 99215 OFFICE O/P EST HI 40 MIN: CPT | Mod: PBBFAC | Performed by: NURSE PRACTITIONER

## 2025-03-20 PROCEDURE — 3079F DIAST BP 80-89 MM HG: CPT | Mod: CPTII,,, | Performed by: NURSE PRACTITIONER

## 2025-03-20 PROCEDURE — 3051F HG A1C>EQUAL 7.0%<8.0%: CPT | Mod: CPTII,,, | Performed by: NURSE PRACTITIONER

## 2025-03-20 PROCEDURE — 3075F SYST BP GE 130 - 139MM HG: CPT | Mod: CPTII,,, | Performed by: NURSE PRACTITIONER

## 2025-03-20 PROCEDURE — 82985 ASSAY OF GLYCATED PROTEIN: CPT

## 2025-03-20 PROCEDURE — 3008F BODY MASS INDEX DOCD: CPT | Mod: CPTII,,, | Performed by: NURSE PRACTITIONER

## 2025-03-20 PROCEDURE — 36415 COLL VENOUS BLD VENIPUNCTURE: CPT

## 2025-03-20 PROCEDURE — 84681 ASSAY OF C-PEPTIDE: CPT

## 2025-03-20 RX ORDER — KETOROLAC TROMETHAMINE 5 MG/ML
SOLUTION OPHTHALMIC
COMMUNITY
Start: 2025-02-25

## 2025-03-20 RX ORDER — FLUTICASONE PROPIONATE 50 MCG
1 SPRAY, SUSPENSION (ML) NASAL
COMMUNITY

## 2025-03-20 RX ORDER — METOPROLOL TARTRATE 50 MG/1
50 TABLET ORAL 2 TIMES DAILY
COMMUNITY
Start: 2025-03-02

## 2025-03-20 RX ORDER — METRONIDAZOLE 500 MG/1
500 TABLET ORAL 2 TIMES DAILY
COMMUNITY
Start: 2025-03-10

## 2025-03-20 RX ORDER — LOSARTAN POTASSIUM AND HYDROCHLOROTHIAZIDE 25; 100 MG/1; MG/1
1 TABLET ORAL
COMMUNITY
Start: 2024-11-03

## 2025-03-20 RX ORDER — RIZATRIPTAN BENZOATE 10 MG/1
TABLET, ORALLY DISINTEGRATING ORAL
COMMUNITY

## 2025-03-20 RX ORDER — PAROXETINE 10 MG/1
TABLET, FILM COATED ORAL
COMMUNITY

## 2025-03-20 NOTE — TELEPHONE ENCOUNTER
Patient was seen in clinic today she has been staying in automated mode over 90% of the time.  I instructed her that she will call you tomorrow since your office is being repainted today and you may not be via the phone

## 2025-03-20 NOTE — TELEPHONE ENCOUNTER
Called and spoke with patient informed contact DM Education per Provider, patient verbalized understanding

## 2025-03-20 NOTE — PROGRESS NOTES
Subjective     Patient ID: Donna Romero is a 54 y.o. female.    Chief Complaint: Follow-up (Type 2 diabetes)    Endocrine clinic note 12/06/2024:  54-year-old female scheduled today for endocrine clinic as new patient referral.  History of uncontrolled type 2 diabetes currently on Omnipod dash.  Patient states she was on oral medications her A1c was > 14 and she was started on MDI then transitioned to an Omnipod.  Current A1c 9.2.  Patient has a the clinic today she does not have her Dexcom G6 x2 weeks states she needs a transmitter.  Patient has been on Omnipod-but has had no formal training and does not enter carbs are prandial doses.  Unable to download patient's Dexcom today she is not 1 in 2 weeks and did not bring her .  Patient states her PCP road supplies for an Omnipod 5 and she has a supplies but has not been able to start due to have no one able to train her.  Discussed with the patient I will have her set up with [employer] next week she is to bring Omnipod 5 supplies with Dexcom G6 supplies for formal training and to convert to the Omnipod 5.  Patient states currently at night that she is having hypoglycemia and waking up sweating on today's visit decrease patient's nighttime basal rate on her Omnipod.  Patient previously had medications which she states was stopped then restarted by different PCP were then stopped again she states Jardiance was rewritten she did not restart.  Discussed patient 1st we will complete a C-peptide and cherry 65 that she is not to restart until C-peptide is complete.  Foot exam performed today see documentation.  eye exam fundus performed today.     Endocrine clinic note 03/20/2025; 54-year-old female scheduled today for endocrine clinic follow-up.  History of uncontrolled type 2 diabetes currently on Omnipod 5.  Patient was seen as a new patient on 12/06/2024 with a an A1c >14.0 previously on oral medications and MDI.  On patient's lab C-peptide was 0.6 with  a concurrent glucose of 74 with a cherry 65 at 0.00 testing as inconclusive due to low blood glucose numbers.  Patient is currently on Omnipod 5 and A1c is currently 8.3 and previously 7.5.  Patient is asking if she can go back on oral medications.  Discussed with the patient today I will repeat her C-peptide with a higher glucose to ensure that her C-peptide is normal.    Patient has a Omnipod with integrated Dexcom G7 interpretation 03/07/2025-03/20/2025.  Time CGM active 81.6% 11.4 days.  Glucose time in range 2% very high, 31% high, 66% target range, 1% low, 0% very low.  Average glucose is 159.  Patient stays in automated mode 92% of the time.  Patient is entering carbs with all meals occasionally in the morning patient will have hypoglycemia after entering carbs indicating she may be eating less.  Patient's time in range has increase to 66%.  Today I will repeat patient's C-peptide patient's C-peptide is normal we will try a GLP 1 or oral medications.                     Review of Systems   Constitutional:  Negative for activity change, appetite change and fatigue.   HENT:  Negative for dental problem, hearing loss, tinnitus, trouble swallowing and goiter.    Eyes:  Negative for photophobia, pain and visual disturbance.   Respiratory:  Negative for cough, chest tightness and wheezing.    Cardiovascular:  Negative for chest pain, palpitations and leg swelling.   Gastrointestinal:  Negative for abdominal pain, constipation, diarrhea, nausea and reflux.   Endocrine: Negative for cold intolerance, heat intolerance, polydipsia and polyphagia.   Genitourinary:  Negative for difficulty urinating, flank pain, hematuria, hot flashes, menstrual irregularity, menstrual problem, nocturia and urgency.   Musculoskeletal:  Negative for back pain, gait problem, joint swelling, leg pain and joint deformity.   Integumentary:  Negative for color change, pallor, rash and breast discharge.   Allergic/Immunologic: Negative for  environmental allergies, food allergies and immunocompromised state.   Neurological:  Negative for tremors, seizures, headaches, memory loss and coordination difficulties.   Psychiatric/Behavioral:  Negative for agitation, behavioral problems and sleep disturbance. The patient is not nervous/anxious.           Objective     Physical Exam  Constitutional:       General: She is not in acute distress.     Appearance: Normal appearance. She is not ill-appearing.   HENT:      Head: Normocephalic and atraumatic.      Right Ear: External ear normal.      Left Ear: External ear normal.      Nose: Nose normal. No congestion or rhinorrhea.      Mouth/Throat:      Mouth: Mucous membranes are moist.      Pharynx: Oropharynx is clear. No oropharyngeal exudate.   Eyes:      General:         Right eye: No discharge.         Left eye: No discharge.      Conjunctiva/sclera: Conjunctivae normal.      Pupils: Pupils are equal, round, and reactive to light.   Neck:      Thyroid: No thyroid mass, thyromegaly or thyroid tenderness.   Cardiovascular:      Rate and Rhythm: Normal rate and regular rhythm.      Pulses: Normal pulses.      Heart sounds: Normal heart sounds. No murmur heard.  Pulmonary:      Effort: Pulmonary effort is normal. No respiratory distress.      Breath sounds: Normal breath sounds.   Abdominal:      General: Abdomen is flat. Bowel sounds are normal. There is no distension.      Palpations: Abdomen is soft.      Tenderness: There is no abdominal tenderness.   Musculoskeletal:         General: No swelling or tenderness. Normal range of motion.      Cervical back: Normal range of motion and neck supple. No tenderness.      Right lower leg: No edema.      Left lower leg: No edema.   Feet:      Right foot:      Skin integrity: Skin integrity normal.      Left foot:      Skin integrity: Skin integrity normal.   Lymphadenopathy:      Cervical: No cervical adenopathy.   Skin:     General: Skin is warm and dry.       Coloration: Skin is not jaundiced or pale.   Neurological:      General: No focal deficit present.      Mental Status: She is alert and oriented to person, place, and time. Mental status is at baseline.      Coordination: Coordination normal.      Gait: Gait normal.   Psychiatric:         Mood and Affect: Mood normal.         Behavior: Behavior normal.         Thought Content: Thought content normal.            Assessment and Plan     1. Uncontrolled type 2 diabetes mellitus with hyperglycemia  Recent A1C 7.5    A1C goal <7.0   Medications:  Omnipod 5 Changes:  Repeat C-peptide today if C-peptide is normal consider a GLP 1 oral medications  Yearly Diabetic Eye Exam: 12/06/2024   Yearly Diabetic  Foot Exam: 12/06/2024    -     Basic Metabolic Panel; Future; Expected date: 03/20/2025  -     C-Peptide; Future; Expected date: 03/20/2025  -     Fructosamine; Future; Expected date: 03/20/2025  Component  Ref Range & Units (hover) 3 mo ago   C-Peptide, S 0.6 Low          Component  Ref Range & Units (hover) 3 mo ago   GAD65 Ab Assay, S 0.00   Comment:              Follow up in about 4 months (around 7/20/2025) for Type 2 diabetes, with Omnipod 5, w/ Dexcom.    I spent a total of 30 minutes on the day of the visit.  This includes face to face time and non-face to face time preparing to see the patient (eg, review of tests), obtaining and/or reviewing separately obtained history, documenting clinical information in the electronic or other health record, independently interpreting results and communicating results to the patient/family/caregiver, or care coordinator.

## 2025-03-21 LAB
C PEPTIDE P FAST SERPL-MCNC: 1.7 NG/ML (ref 1.1–4.4)
FRUCTOSAMINE SERPL-SCNC: 295 MCMOL/L (ref 200–285)

## 2025-03-25 ENCOUNTER — TELEPHONE (OUTPATIENT)
Dept: ENDOCRINOLOGY | Facility: CLINIC | Age: 55
End: 2025-03-25
Payer: MEDICAID

## 2025-03-25 NOTE — TELEPHONE ENCOUNTER
----- Message from Alicia sent at 3/25/2025 10:23 AM CDT -----  Patient of KhushbuPatient is asking for cream and antibiotic for boils she is catching due to sugar being high.[phone],

## 2025-03-25 NOTE — TELEPHONE ENCOUNTER
Informed patient to follow up with PCP or got to nearest ED or urgent care, patient verbalized understanding

## 2025-03-25 NOTE — TELEPHONE ENCOUNTER
----- Message from Camila sent at 3/25/2025  9:54 AM CDT -----  Pt of Edd called left a voice message asking if Khushbu can prescribe her some cream for an infection.Pt requesting a call back.Pt call back number  [phone] advise

## 2025-03-25 NOTE — TELEPHONE ENCOUNTER
Returned patient call to follow up no answer called second number no answer unable to leave voicemail

## 2025-03-26 LAB — INSULIN AB SER-SCNC: 0.15 NMOL/L (ref 0–0.02)

## 2025-03-27 ENCOUNTER — RESULTS FOLLOW-UP (OUTPATIENT)
Dept: ENDOCRINOLOGY | Facility: CLINIC | Age: 55
End: 2025-03-27

## 2025-04-02 ENCOUNTER — LAB VISIT (OUTPATIENT)
Dept: LAB | Facility: HOSPITAL | Age: 55
End: 2025-04-02
Attending: ALLERGY & IMMUNOLOGY
Payer: MEDICAID

## 2025-04-02 DIAGNOSIS — M06.09 RHEUMATOID ARTHRITIS OF MULTIPLE SITES WITHOUT RHEUMATOID FACTOR: Primary | ICD-10-CM

## 2025-04-02 LAB
ALBUMIN SERPL-MCNC: 3.3 G/DL (ref 3.5–5)
ALBUMIN/GLOB SERPL: 0.7 RATIO (ref 1.1–2)
ALP SERPL-CCNC: 88 UNIT/L (ref 40–150)
ALT SERPL-CCNC: 12 UNIT/L (ref 0–55)
ANION GAP SERPL CALC-SCNC: 7 MEQ/L
AST SERPL-CCNC: 18 UNIT/L (ref 11–45)
BASOPHILS # BLD AUTO: 0.05 X10(3)/MCL
BASOPHILS NFR BLD AUTO: 0.5 %
BILIRUB SERPL-MCNC: 0.4 MG/DL
BUN SERPL-MCNC: 18 MG/DL (ref 9.8–20.1)
CALCIUM SERPL-MCNC: 9.3 MG/DL (ref 8.4–10.2)
CHLORIDE SERPL-SCNC: 105 MMOL/L (ref 98–107)
CO2 SERPL-SCNC: 25 MMOL/L (ref 22–29)
CREAT SERPL-MCNC: 0.89 MG/DL (ref 0.55–1.02)
CREAT/UREA NIT SERPL: 20
CRP SERPL-MCNC: 18.7 MG/L
EOSINOPHIL # BLD AUTO: 0.09 X10(3)/MCL (ref 0–0.9)
EOSINOPHIL NFR BLD AUTO: 0.9 %
ERYTHROCYTE [DISTWIDTH] IN BLOOD BY AUTOMATED COUNT: 15.1 % (ref 11.5–17)
ERYTHROCYTE [SEDIMENTATION RATE] IN BLOOD: 52 MM/HR (ref 0–20)
GFR SERPLBLD CREATININE-BSD FMLA CKD-EPI: >60 ML/MIN/1.73/M2
GLOBULIN SER-MCNC: 4.7 GM/DL (ref 2.4–3.5)
GLUCOSE SERPL-MCNC: 110 MG/DL (ref 74–100)
HCT VFR BLD AUTO: 41.5 % (ref 37–47)
HGB BLD-MCNC: 13.2 G/DL (ref 12–16)
IMM GRANULOCYTES # BLD AUTO: 0.03 X10(3)/MCL (ref 0–0.04)
IMM GRANULOCYTES NFR BLD AUTO: 0.3 %
LYMPHOCYTES # BLD AUTO: 3.25 X10(3)/MCL (ref 0.6–4.6)
LYMPHOCYTES NFR BLD AUTO: 34 %
MCH RBC QN AUTO: 27.4 PG (ref 27–31)
MCHC RBC AUTO-ENTMCNC: 31.8 G/DL (ref 33–36)
MCV RBC AUTO: 86.1 FL (ref 80–94)
MONOCYTES # BLD AUTO: 0.49 X10(3)/MCL (ref 0.1–1.3)
MONOCYTES NFR BLD AUTO: 5.1 %
NEUTROPHILS # BLD AUTO: 5.66 X10(3)/MCL (ref 2.1–9.2)
NEUTROPHILS NFR BLD AUTO: 59.2 %
NRBC BLD AUTO-RTO: 0 %
PLATELET # BLD AUTO: 375 X10(3)/MCL (ref 130–400)
PMV BLD AUTO: 9.8 FL (ref 7.4–10.4)
POTASSIUM SERPL-SCNC: 5 MMOL/L (ref 3.5–5.1)
PROT SERPL-MCNC: 8 GM/DL (ref 6.4–8.3)
RBC # BLD AUTO: 4.82 X10(6)/MCL (ref 4.2–5.4)
SODIUM SERPL-SCNC: 137 MMOL/L (ref 136–145)
WBC # BLD AUTO: 9.57 X10(3)/MCL (ref 4.5–11.5)

## 2025-04-02 PROCEDURE — 80053 COMPREHEN METABOLIC PANEL: CPT

## 2025-04-02 PROCEDURE — 36415 COLL VENOUS BLD VENIPUNCTURE: CPT

## 2025-04-02 PROCEDURE — 86140 C-REACTIVE PROTEIN: CPT

## 2025-04-02 PROCEDURE — 85652 RBC SED RATE AUTOMATED: CPT

## 2025-04-02 PROCEDURE — 85025 COMPLETE CBC W/AUTO DIFF WBC: CPT

## 2025-04-16 ENCOUNTER — TELEPHONE (OUTPATIENT)
Dept: ENDOCRINOLOGY | Facility: CLINIC | Age: 55
End: 2025-04-16
Payer: MEDICAID

## 2025-04-16 DIAGNOSIS — E11.65 UNCONTROLLED TYPE 2 DIABETES MELLITUS WITH HYPERGLYCEMIA: ICD-10-CM

## 2025-04-16 RX ORDER — BLOOD-GLUCOSE SENSOR
EACH MISCELLANEOUS
Qty: 3 EACH | Refills: 11 | Status: CANCELLED | OUTPATIENT
Start: 2025-04-16

## 2025-04-16 NOTE — TELEPHONE ENCOUNTER
Patient of Khushbu  Unable to receive Dexcom sensors at current pharmacy  Request [facility] Home delivery

## 2025-04-16 NOTE — TELEPHONE ENCOUNTER
----- Message from Alicia sent at 4/16/2025 10:57 AM CDT -----  Patient of KhushbuPatient called stating she is having problems with getting [facility] Patient wants to know if she can get G7 delivered to her house through [facility], with a new script it can be delivered at her home.[phone],

## 2025-04-17 NOTE — TELEPHONE ENCOUNTER
I proposed the Rx just changing pharmacy per patient request. The pharmacy will fill her sensors and delivery to patient home.

## 2025-05-14 ENCOUNTER — TELEPHONE (OUTPATIENT)
Dept: DIABETES | Facility: CLINIC | Age: 55
End: 2025-05-14
Payer: MEDICAID

## 2025-05-14 DIAGNOSIS — E11.65 UNCONTROLLED TYPE 2 DIABETES MELLITUS WITH HYPERGLYCEMIA: ICD-10-CM

## 2025-05-14 RX ORDER — INSULIN PMP CART,AUT,G6/7,CNTR
EACH SUBCUTANEOUS
Qty: 10 EACH | Refills: 11 | Status: SHIPPED | OUTPATIENT
Start: 2025-05-14

## 2025-05-14 NOTE — TELEPHONE ENCOUNTER
----- Message from Alicia sent at 5/14/2025  9:34 AM CDT -----  Patient of KhushbuPatient left message requesting refill for Omni pod.Patient # [phone],

## 2025-06-19 ENCOUNTER — LAB VISIT (OUTPATIENT)
Dept: LAB | Facility: HOSPITAL | Age: 55
End: 2025-06-19
Attending: ALLERGY & IMMUNOLOGY
Payer: MEDICAID

## 2025-06-19 DIAGNOSIS — M06.09 RHEUMATOID ARTHRITIS OF MULTIPLE SITES WITHOUT RHEUMATOID FACTOR: Primary | ICD-10-CM

## 2025-06-19 LAB
ALBUMIN SERPL-MCNC: 3.1 G/DL (ref 3.5–5)
ALBUMIN/GLOB SERPL: 0.7 RATIO (ref 1.1–2)
ALP SERPL-CCNC: 74 UNIT/L (ref 40–150)
ALT SERPL-CCNC: 11 UNIT/L (ref 0–55)
ANION GAP SERPL CALC-SCNC: 5 MEQ/L
AST SERPL-CCNC: 13 UNIT/L (ref 11–45)
BASOPHILS # BLD AUTO: 0.03 X10(3)/MCL
BASOPHILS NFR BLD AUTO: 0.3 %
BILIRUB SERPL-MCNC: 0.5 MG/DL
BUN SERPL-MCNC: 12 MG/DL (ref 9.8–20.1)
CALCIUM SERPL-MCNC: 9.2 MG/DL (ref 8.4–10.2)
CHLORIDE SERPL-SCNC: 106 MMOL/L (ref 98–107)
CO2 SERPL-SCNC: 29 MMOL/L (ref 22–29)
CREAT SERPL-MCNC: 0.96 MG/DL (ref 0.55–1.02)
CREAT/UREA NIT SERPL: 13
CRP SERPL-MCNC: 21.2 MG/L
EOSINOPHIL # BLD AUTO: 0.06 X10(3)/MCL (ref 0–0.9)
EOSINOPHIL NFR BLD AUTO: 0.5 %
ERYTHROCYTE [DISTWIDTH] IN BLOOD BY AUTOMATED COUNT: 14.8 % (ref 11.5–17)
ERYTHROCYTE [SEDIMENTATION RATE] IN BLOOD: 54 MM/HR (ref 0–20)
GFR SERPLBLD CREATININE-BSD FMLA CKD-EPI: >60 ML/MIN/1.73/M2
GLOBULIN SER-MCNC: 4.7 GM/DL (ref 2.4–3.5)
GLUCOSE SERPL-MCNC: 90 MG/DL (ref 74–100)
HCT VFR BLD AUTO: 39.3 % (ref 37–47)
HGB BLD-MCNC: 12.6 G/DL (ref 12–16)
IMM GRANULOCYTES # BLD AUTO: 0.04 X10(3)/MCL (ref 0–0.04)
IMM GRANULOCYTES NFR BLD AUTO: 0.4 %
LYMPHOCYTES # BLD AUTO: 3.12 X10(3)/MCL (ref 0.6–4.6)
LYMPHOCYTES NFR BLD AUTO: 28.1 %
MCH RBC QN AUTO: 28.3 PG (ref 27–31)
MCHC RBC AUTO-ENTMCNC: 32.1 G/DL (ref 33–36)
MCV RBC AUTO: 88.1 FL (ref 80–94)
MONOCYTES # BLD AUTO: 0.34 X10(3)/MCL (ref 0.1–1.3)
MONOCYTES NFR BLD AUTO: 3.1 %
NEUTROPHILS # BLD AUTO: 7.52 X10(3)/MCL (ref 2.1–9.2)
NEUTROPHILS NFR BLD AUTO: 67.6 %
NRBC BLD AUTO-RTO: 0 %
PLATELET # BLD AUTO: 375 X10(3)/MCL (ref 130–400)
PMV BLD AUTO: 9.7 FL (ref 7.4–10.4)
POTASSIUM SERPL-SCNC: 4.5 MMOL/L (ref 3.5–5.1)
PROT SERPL-MCNC: 7.8 GM/DL (ref 6.4–8.3)
RBC # BLD AUTO: 4.46 X10(6)/MCL (ref 4.2–5.4)
SODIUM SERPL-SCNC: 140 MMOL/L (ref 136–145)
WBC # BLD AUTO: 11.11 X10(3)/MCL (ref 4.5–11.5)

## 2025-06-19 PROCEDURE — 86140 C-REACTIVE PROTEIN: CPT

## 2025-06-19 PROCEDURE — 36415 COLL VENOUS BLD VENIPUNCTURE: CPT

## 2025-06-19 PROCEDURE — 80053 COMPREHEN METABOLIC PANEL: CPT

## 2025-06-19 PROCEDURE — 85025 COMPLETE CBC W/AUTO DIFF WBC: CPT

## 2025-06-19 PROCEDURE — 85652 RBC SED RATE AUTOMATED: CPT

## 2025-07-09 DIAGNOSIS — M25.461 EFFUSION OF RIGHT KNEE: Primary | ICD-10-CM

## 2025-07-15 ENCOUNTER — OFFICE VISIT (OUTPATIENT)
Dept: ORTHOPEDICS | Facility: CLINIC | Age: 55
End: 2025-07-15
Payer: MEDICAID

## 2025-07-15 ENCOUNTER — HOSPITAL ENCOUNTER (OUTPATIENT)
Dept: RADIOLOGY | Facility: HOSPITAL | Age: 55
Discharge: HOME | End: 2025-07-15
Attending: SURGERY
Payer: MEDICAID

## 2025-07-15 VITALS
WEIGHT: 174.19 LBS | TEMPERATURE: 98 F | OXYGEN SATURATION: 98 % | BODY MASS INDEX: 30.86 KG/M2 | SYSTOLIC BLOOD PRESSURE: 135 MMHG | HEIGHT: 63 IN | DIASTOLIC BLOOD PRESSURE: 93 MMHG | HEART RATE: 103 BPM

## 2025-07-15 DIAGNOSIS — M17.0 BILATERAL PRIMARY OSTEOARTHRITIS OF KNEE: ICD-10-CM

## 2025-07-15 DIAGNOSIS — M25.561 RIGHT KNEE PAIN, UNSPECIFIED CHRONICITY: ICD-10-CM

## 2025-07-15 DIAGNOSIS — M25.562 LEFT KNEE PAIN, UNSPECIFIED CHRONICITY: ICD-10-CM

## 2025-07-15 DIAGNOSIS — M25.562 LEFT KNEE PAIN, UNSPECIFIED CHRONICITY: Primary | ICD-10-CM

## 2025-07-15 PROCEDURE — 99215 OFFICE O/P EST HI 40 MIN: CPT | Mod: PBBFAC,25

## 2025-07-15 PROCEDURE — 73564 X-RAY EXAM KNEE 4 OR MORE: CPT | Mod: TC,LT

## 2025-07-15 PROCEDURE — 73564 X-RAY EXAM KNEE 4 OR MORE: CPT | Mod: TC,RT

## 2025-07-15 RX ORDER — DOXYCYCLINE 100 MG/1
1 TABLET ORAL 2 TIMES DAILY
COMMUNITY
Start: 2025-03-20

## 2025-07-15 NOTE — PROGRESS NOTES
"Subjective:    Patient ID: Donna Romero is a 54 y.o. female  who presented to [facility] for new visit of bilateral knee pain.    Chief Complaint: Pain of the Left Knee and Pain of the Right Knee    History of Present Illness:  Donna Romero presented today with knee pain involving both knees R > L for the past 5 years but acutely worsening in the last one year. Pain is located anteriorly in bilateral knees, and rated 5/10 at rest, and 10/10 with activity. Quality of pain is described as Sharp.  Patient denies any inciting events. Current symptoms include: locking and stiffness. Pain is aggravated by any weight bearing and better with rest. Evaluation to date: plain films, PT evaluation (currently in PT once a week for the last 3 weeks), Ortho evaluation (seen by Ortho in [facility] who referred her to PT).   Treatment to date: corticosteroid injection which was not very effective, OTC analgesics which are somewhat effective, and PT which was somewhat effective. Expectations for today's visit includes exploring further treatment options.    Knee Review of Systems:  Swelling?  Yes  Instability?  Yes  Mechanical sx?  No  <30 min AM stiffness? Yes  Limited ROM? Yes  Fever/Chills? No    Co-morbidities  HTN  DM II  Hyperlipidemia  Hx of TIA  Depression     Objective:      Physical Exam:    BP (!) 135/93 (Patient Position: Sitting)   Pulse 103   Temp 97.9 °F (36.6 °C)   Ht 5' 3" (1.6 m)   Wt 79 kg (174 lb 2.6 oz)   SpO2 98%   BMI 30.85 kg/m²     Ortho/SPM Exam    Appearance:  Normal gait/station  FWB  Alignment: Left: normal Right: normal   Soft tissue swelling: Left: no Right: no  Effusion: Left:  Negative Right: Negative  Erythema: Left no Right: no  Ecchymosis: Left: no Right: no  Atrophy: Left: no Right: no    Palpation:  Knee Tenderness: Left: anterior knee joint Right: anterior knee joint    Range of motion:  Flexion: Left:  120 Right: " 120  Extension: Left: 0 Right: 0    Strength:  L - Strength:  Extension - 5/5  Pain: No    Flexion - 5/5 Pain: No   R- Strength:  Extension - 5/5 Pain: No     Flexion - 5/5 Pain: No      Special Tests:  Ballotable Effusion:Left: Negative Right: Negative   Fluid Wave: Left: Negative Right: Negative   Crepitus: Left: Positive Right: Positive   Patellar grind test: Left: Negative  Right: Negative  Apprehension test: Left: Negative Right: Negative   Varus: Left Negative @ 0 Positive @ 30 Right: Negative @ 0 Positive @ 30  Valgus: Left Negative @ 0 Negative @ 30 Right: Negative @ 0 Negative @ 30  Lachman: Left: Negative Right: Negative   Ant Drawer: Left: Negative Right: Negative   Posterior Drawer: Left: Negative Right: Negative   Dial Test: Left: Negative Right: Negative   Gracie: Left: Negative Right: Negative   Apley's: Left: Not performed Right: Not performed   Thessaly's: Left: Not performed Right: Not performed   Noble Compression: Left: Not performed Right: Not performed   Deidra: Left: Not performed Right: Not performed       General appearance: NAD  Peripheral pulses: normal bilaterally   Sensation: normal    Labs:  Last A1c: 7.5     Imaging:   Previous images reviewed.  X-rays ordered and performed today: Yes  # of views: 4 Laterality: bilateral  My Interpretation:  shows DJD changes, likely chronic . Joint space narrowing worse in medial compartment bilaterally, subchondral sclerosis. KL grade 2 bilaterally.    Assessment:        Encounter Diagnoses   Name Primary?    Right knee pain, unspecified chronicity     Left knee pain, unspecified chronicity Yes    Bilateral primary osteoarthritis of knee         Plan:       Dx: bilateral Knee Osteoarthritis. Acute in moderate exacerbation.   Treatment Plan: Discussed with patient diagnosis, prognosis, and treatment recommendations. Education provided.  Patient is currently in physical therapy (for the past 3 weeks and still ongoing). She has tried steroid injection  in bilateral knees in the past and it provided only 2 weeks of limited relief. She does not want to try CSI again.   Discussed avoidance of aggravating activity, weight loss, continuing physical therapy and HEP and continuing use of Tylenol, Voltaren gel and heat/ice as needed for pain. Also discussed option for gel injections. Patient verbalized understanding.  She would like to complete physical therapy prior to exploring gel injections. She will call us back if interested in injections.   Imaging: radiological studies ordered and independently reviewed; discussed with patient; pending radiologist interpretation.   Weight Management: is paramount. recommend at least 10% of total body weight loss if your bmi is 30-34.9. A bmi 24.9 or less may provide further relief..   Procedure: Discussed CSI/VSI as treatment options; discussed injections as treatment options in future if conservative measures do not improve symptoms.  Activity: Activity as tolerated; HEP to include aerobic conditioning and strength training with non-painful activity. ROM/STG exercises. Proper footware; assistive devises to avoid limping.   Therapy: Currently in physical therapy for bilateral knees. Continue PT and HEP  Medication: CONTINUE over-the-counter acetaminophen (Tylenol 1000 mg three times per day as needed)  CONTINUE Voltaren Gel 1% as prescribed. Please see your primary care physician for further refills.  RTC: PRN; call if any issues.          Felisa Cavazos MD  [facility] Resident, HO-3

## 2025-07-15 NOTE — PROGRESS NOTES
Faculty Attestation: Donna Romero  was seen in Sports Medicine Clinic Discussed with resident at the time of the visit. History of Present Illness, Physical Exam, and Assessment and Plan reviewed.     Treatment plan is reasonable and appropriate. Compliance with treatment recommendations is important.      Radiology images independently reviewed and agree with resident interpretation.     No procedure was performed.    Daniel Abrams MD  Sports Medicine

## 2025-08-04 ENCOUNTER — OFFICE VISIT (OUTPATIENT)
Dept: ENDOCRINOLOGY | Facility: CLINIC | Age: 55
End: 2025-08-04
Payer: MEDICAID

## 2025-08-04 VITALS
BODY MASS INDEX: 31.45 KG/M2 | HEART RATE: 94 BPM | HEIGHT: 63 IN | RESPIRATION RATE: 18 BRPM | WEIGHT: 177.5 LBS | DIASTOLIC BLOOD PRESSURE: 77 MMHG | SYSTOLIC BLOOD PRESSURE: 143 MMHG | TEMPERATURE: 97 F

## 2025-08-04 DIAGNOSIS — E11.65 UNCONTROLLED TYPE 2 DIABETES MELLITUS WITH HYPERGLYCEMIA: Primary | ICD-10-CM

## 2025-08-04 DIAGNOSIS — H35.00 RETINOPATHY: ICD-10-CM

## 2025-08-04 LAB — HBA1C MFR BLD: 7.8 %

## 2025-08-04 PROCEDURE — 3077F SYST BP >= 140 MM HG: CPT | Mod: CPTII,,, | Performed by: NURSE PRACTITIONER

## 2025-08-04 PROCEDURE — 95251 CONT GLUC MNTR ANALYSIS I&R: CPT | Mod: ,,, | Performed by: NURSE PRACTITIONER

## 2025-08-04 PROCEDURE — 3061F NEG MICROALBUMINURIA REV: CPT | Mod: CPTII,,, | Performed by: NURSE PRACTITIONER

## 2025-08-04 PROCEDURE — 1159F MED LIST DOCD IN RCRD: CPT | Mod: CPTII,,, | Performed by: NURSE PRACTITIONER

## 2025-08-04 PROCEDURE — 83036 HEMOGLOBIN GLYCOSYLATED A1C: CPT | Mod: PBBFAC | Performed by: NURSE PRACTITIONER

## 2025-08-04 PROCEDURE — 3066F NEPHROPATHY DOC TX: CPT | Mod: CPTII,,, | Performed by: NURSE PRACTITIONER

## 2025-08-04 PROCEDURE — 3051F HG A1C>EQUAL 7.0%<8.0%: CPT | Mod: CPTII,,, | Performed by: NURSE PRACTITIONER

## 2025-08-04 PROCEDURE — 99214 OFFICE O/P EST MOD 30 MIN: CPT | Mod: S$PBB,,, | Performed by: NURSE PRACTITIONER

## 2025-08-04 PROCEDURE — 99215 OFFICE O/P EST HI 40 MIN: CPT | Mod: PBBFAC | Performed by: NURSE PRACTITIONER

## 2025-08-04 PROCEDURE — 1160F RVW MEDS BY RX/DR IN RCRD: CPT | Mod: CPTII,,, | Performed by: NURSE PRACTITIONER

## 2025-08-04 PROCEDURE — 3008F BODY MASS INDEX DOCD: CPT | Mod: CPTII,,, | Performed by: NURSE PRACTITIONER

## 2025-08-04 PROCEDURE — 3080F DIAST BP >= 90 MM HG: CPT | Mod: CPTII,,, | Performed by: NURSE PRACTITIONER

## 2025-08-04 RX ORDER — SEMAGLUTIDE 0.68 MG/ML
0.5 INJECTION, SOLUTION SUBCUTANEOUS
Qty: 3 ML | Refills: 5 | Status: SHIPPED | OUTPATIENT
Start: 2025-08-04

## 2025-08-04 NOTE — PROGRESS NOTES
Subjective     Patient ID: Donna Romero is a 54 y.o. female.    Chief Complaint: No chief complaint on file.    Endocrine clinic note 12/06/2024:  54-year-old female scheduled today for endocrine clinic as new patient referral.  History of uncontrolled type 2 diabetes currently on Omnipod dash.  Patient states she was on oral medications her A1c was > 14 and she was started on MDI then transitioned to an Omnipod.  Current A1c 9.2.  Patient has a the clinic today she does not have her Dexcom G6 x2 weeks states she needs a transmitter.  Patient has been on Omnipod-but has had no formal training and does not enter carbs are prandial doses.  Unable to download patient's Dexcom today she is not 1 in 2 weeks and did not bring her .  Patient states her PCP road supplies for an Omnipod 5 and she has a supplies but has not been able to start due to have no one able to train her.  Discussed with the patient I will have her set up with [employer] next week she is to bring Omnipod 5 supplies with Dexcom G6 supplies for formal training and to convert to the Omnipod 5.  Patient states currently at night that she is having hypoglycemia and waking up sweating on today's visit decrease patient's nighttime basal rate on her Omnipod.  Patient previously had medications which she states was stopped then restarted by different PCP were then stopped again she states Jardiance was rewritten she did not restart.  Discussed patient 1st we will complete a C-peptide and cherry 65 that she is not to restart until C-peptide is complete.  Foot exam performed today see documentation.  eye exam fundus performed today.      Endocrine clinic note 03/20/2025; 54-year-old female scheduled today for endocrine clinic follow-up.  History of uncontrolled type 2 diabetes currently on Omnipod 5.  Patient was seen as a new patient on 12/06/2024 with a an A1c >14.0 previously on oral medications and MDI.  On patient's lab C-peptide was 0.6 with  a concurrent glucose of 74 with a cherry 65 at 0.00 testing as inconclusive due to low blood glucose numbers.  Patient is currently on Omnipod 5 and A1c is currently 8.3 and previously 7.5.  Patient is asking if she can go back on oral medications.  Discussed with the patient today I will repeat her C-peptide with a higher glucose to ensure that her C-peptide is normal. Patient has a Omnipod with integrated Dexcom G7 interpretation 03/07/2025-03/20/2025.  Time CGM active 81.6% 11.4 days.  Glucose time in range 2% very high, 31% high, 66% target range, 1% low, 0% very low.  Average glucose is 159.  Patient stays in automated mode 92% of the time.  Patient is entering carbs with all meals occasionally in the morning patient will have hypoglycemia after entering carbs indicating she may be eating less.  Patient's time in range has increase to 66%. Today I will repeat patient's C-peptide patient's C-peptide is normal we will try a GLP 1 or oral medications.       Endocrine clinic note 08/04/2025:  54-year-old female scheduled today for endocrine clinic follow-up.  History of uncontrolled type 2 diabetes currently on a Omnipod 5.  Current A1c 7.8 patient was established as a new patient A1c was >14.0.     Patient has a Omnipod 5 with integrated Dexcom interpretation 07/22/2025-08/04/2025.  Time CGM active 40.2% 5.6 days.  Average glucose 174.  Time in range 7% very high, 36% high, 56% target range, 0% low, 1% very low.  Patient did have hypoglycemia when in manual mode.  Patient remains an automated mode 58% of the time, our immediate limited 25%, manual mode 42%.  Patient has days with no Dexcom integrated.  Patient states this is due to not having a Dexcom from her pharmacy.  When patient is in automated mode blood sugars remained stable with hypoglycemic episodes and no prandial excursions.  When patient is in manual mode patient has hypoglycemia in severe excursions.  Patient did not have Dexcom for 5 days and was only  entering carbs and not checking blood sugars.    Discussed with the patient she states her pharmacy is not getting her supplies and time.  Pharmacy to call they can order the she had her last box.  Patient was given an extra Dexcom G7 today case the pharmacy can not get 1.  Patient's C-peptide increase to 6.1 we will start a GLP 1 at this time.                                   Review of Systems   Constitutional:  Negative for activity change, appetite change and fatigue.   HENT:  Negative for dental problem, hearing loss, tinnitus, trouble swallowing and goiter.    Eyes:  Negative for photophobia, pain and visual disturbance.   Respiratory:  Negative for cough, chest tightness and wheezing.    Cardiovascular:  Negative for chest pain, palpitations and leg swelling.   Gastrointestinal:  Negative for abdominal pain, constipation, diarrhea, nausea and reflux.   Endocrine: Negative for cold intolerance, heat intolerance, polydipsia and polyphagia.   Genitourinary:  Negative for difficulty urinating, flank pain, hematuria, hot flashes, menstrual irregularity, menstrual problem, nocturia and urgency.   Musculoskeletal:  Negative for back pain, gait problem, joint swelling, leg pain and joint deformity.   Integumentary:  Negative for color change, pallor, rash and breast discharge.   Allergic/Immunologic: Negative for environmental allergies, food allergies and immunocompromised state.   Neurological:  Negative for tremors, seizures, headaches, coordination difficulties and memory loss.   Psychiatric/Behavioral:  Negative for agitation, behavioral problems and sleep disturbance. The patient is not nervous/anxious.           Objective     Physical Exam  Constitutional:       General: She is not in acute distress.     Appearance: Normal appearance. She is not ill-appearing.   HENT:      Head: Normocephalic and atraumatic.      Right Ear: External ear normal.      Left Ear: External ear normal.      Nose: Nose normal. No  congestion or rhinorrhea.      Mouth/Throat:      Mouth: Mucous membranes are moist.      Pharynx: Oropharynx is clear. No oropharyngeal exudate.   Eyes:      General:         Right eye: No discharge.         Left eye: No discharge.      Conjunctiva/sclera: Conjunctivae normal.      Pupils: Pupils are equal, round, and reactive to light.   Neck:      Thyroid: No thyroid mass, thyromegaly or thyroid tenderness.   Cardiovascular:      Rate and Rhythm: Normal rate and regular rhythm.      Pulses: Normal pulses.      Heart sounds: Normal heart sounds. No murmur heard.  Pulmonary:      Effort: Pulmonary effort is normal. No respiratory distress.      Breath sounds: Normal breath sounds.   Abdominal:      General: Abdomen is flat. Bowel sounds are normal. There is no distension.      Palpations: Abdomen is soft.      Tenderness: There is no abdominal tenderness.   Musculoskeletal:         General: No swelling or tenderness. Normal range of motion.      Cervical back: Normal range of motion and neck supple. No tenderness.      Right lower leg: No edema.      Left lower leg: No edema.   Feet:      Right foot:      Skin integrity: Skin integrity normal.      Left foot:      Skin integrity: Skin integrity normal.   Lymphadenopathy:      Cervical: No cervical adenopathy.   Skin:     General: Skin is warm and dry.      Coloration: Skin is not jaundiced or pale.   Neurological:      General: No focal deficit present.      Mental Status: She is alert and oriented to person, place, and time. Mental status is at baseline.      Coordination: Coordination normal.      Gait: Gait normal.   Psychiatric:         Mood and Affect: Mood normal.         Behavior: Behavior normal.         Thought Content: Thought content normal.            Assessment and Plan     1. Uncontrolled type 2 diabetes mellitus with hyperglycemia  Current A1C 7.8 previous A1C 7.5    A1C goal <7.0   Medications:  Omnipod 5 Changes:  Start Ozempic 0.25 mg x 4 weeks  then increase to 0.5 mg   Yearly Diabetic Eye Exam: 12/06/2024   Yearly Diabetic  Foot Exam: 12/06/2024    Component  Ref Range & Units (hover) 4 mo ago 8 mo ago   C-Peptide, S 1.7 0.6 Low  CM   Comment:     -     POCT HEMOGLOBIN A1C  -     semaglutide (OZEMPIC) 0.25 mg or 0.5 mg (2 mg/3 mL) pen injector; Inject 0.5 mg into the skin every 7 days.  Dispense: 3 mL; Refill: 5    2. Retinopathy  Yearly Diabetic Eye Exam: 12/06/2024         Follow up in about 3 months (around 11/4/2025) for Type 2 diabetes, with Omnipod 5, w/ Dexcom.

## 2025-08-05 ENCOUNTER — TELEPHONE (OUTPATIENT)
Dept: ENDOCRINOLOGY | Facility: CLINIC | Age: 55
End: 2025-08-05
Payer: MEDICAID

## 2025-08-05 DIAGNOSIS — E11.65 UNCONTROLLED TYPE 2 DIABETES MELLITUS WITH HYPERGLYCEMIA: Primary | ICD-10-CM

## 2025-08-05 RX ORDER — PEN NEEDLE, DIABETIC 30 GX3/16"
NEEDLE, DISPOSABLE MISCELLANEOUS
Qty: 12 EACH | Refills: 3 | Status: SHIPPED | OUTPATIENT
Start: 2025-08-05

## 2025-08-05 NOTE — TELEPHONE ENCOUNTER
----- Message from Alicia sent at 8/5/2025  9:31 AM CDT -----  Patient of Khushbu     Patient called stating the medication prescribed needs PA.     Patient also stated she needs pen needles for the Ozempic pen     Contact # [phone].     9:48  Thanks

## 2025-08-05 NOTE — TELEPHONE ENCOUNTER
----- Message from Alicia sent at 8/5/2025  9:31 AM CDT -----  Patient of Khushbu    Patient called stating the medication prescribed needs PA.    Patient also stated she needs pens.    Contact # [phone].    9:48  Thanks

## 2025-08-07 ENCOUNTER — TELEPHONE (OUTPATIENT)
Dept: ENDOCRINOLOGY | Facility: CLINIC | Age: 55
End: 2025-08-07
Payer: MEDICAID

## 2025-08-07 NOTE — TELEPHONE ENCOUNTER
----- Message from Alicia sent at 8/7/2025 11:18 AM CDT -----  Patient of Khushbu Ruiz called with patient on the line, stating information for Ozempic is incomplete for approval.    Pharmacy stated a fax was recently sent.    Patient request a call back with status.    Contact [phone].    11:20  Thanks,

## 2025-08-07 NOTE — TELEPHONE ENCOUNTER
Called and spoke with patient informed will follow up and notify once done, patient verbalized understanding

## 2025-08-07 NOTE — TELEPHONE ENCOUNTER
----- Message from Cindy sent at 8/7/2025  9:47 AM CDT -----  Patient of Khushbu     Patient stated Pharmacy needs PA for Ozempic    Patient also needs needles for medication    [id number] - [address]  [address] [address]  Phone: [phone] Fax: [phone]

## 2025-08-07 NOTE — TELEPHONE ENCOUNTER
Returned patient called after speaking with pharmacy, informed that Ozempic PA was denied and patient has refills for needles. Patient verbalized undersatnding

## 2025-08-11 ENCOUNTER — TELEPHONE (OUTPATIENT)
Dept: ENDOCRINOLOGY | Facility: CLINIC | Age: 55
End: 2025-08-11
Payer: MEDICAID

## 2025-08-13 ENCOUNTER — TELEPHONE (OUTPATIENT)
Dept: ENDOCRINOLOGY | Facility: CLINIC | Age: 55
End: 2025-08-13
Payer: MEDICAID

## 2025-08-14 ENCOUNTER — TELEPHONE (OUTPATIENT)
Dept: ENDOCRINOLOGY | Facility: CLINIC | Age: 55
End: 2025-08-14
Payer: MEDICAID